# Patient Record
Sex: FEMALE | Race: OTHER | Employment: FULL TIME | URBAN - METROPOLITAN AREA
[De-identification: names, ages, dates, MRNs, and addresses within clinical notes are randomized per-mention and may not be internally consistent; named-entity substitution may affect disease eponyms.]

---

## 2020-12-01 ENCOUNTER — HOSPITAL ENCOUNTER (OUTPATIENT)
Age: 30
Discharge: HOME | End: 2020-12-01
Attending: OBSTETRICS & GYNECOLOGY | Admitting: OBSTETRICS & GYNECOLOGY
Payer: COMMERCIAL

## 2020-12-01 VITALS
TEMPERATURE: 98.7 F | RESPIRATION RATE: 20 BRPM | WEIGHT: 130 LBS | BODY MASS INDEX: 29.25 KG/M2 | HEART RATE: 82 BPM | HEIGHT: 56 IN | DIASTOLIC BLOOD PRESSURE: 84 MMHG | SYSTOLIC BLOOD PRESSURE: 120 MMHG

## 2020-12-01 PROBLEM — O47.9 UTERINE CONTRACTIONS DURING PREGNANCY: Status: ACTIVE | Noted: 2020-12-01

## 2020-12-01 LAB
A1 MICROGLOB PLACENTAL VAG QL: NEGATIVE
CONTROL LINE PRESENT?: NORMAL
EXPIRATION DATE: NORMAL
INTERNAL NEGATIVE CONTROL: NORMAL
KIT LOT NO.: NORMAL

## 2020-12-01 PROCEDURE — 99283 EMERGENCY DEPT VISIT LOW MDM: CPT

## 2020-12-01 PROCEDURE — 84112 EVAL AMNIOTIC FLUID PROTEIN: CPT | Performed by: ADVANCED PRACTICE MIDWIFE

## 2020-12-01 PROCEDURE — 59025 FETAL NON-STRESS TEST: CPT

## 2020-12-01 NOTE — DISCHARGE INSTRUCTIONS
Patient Education        Week 40 of Your Pregnancy: Care Instructions  Your Care Instructions     By week 36, you have reached your due date. Your baby could be coming any day. But it's a good idea to think ahead to the next few weeks and what might happen. If this is your first time having a baby, try not to worry. If you don't start labor on your own by 41 or 42 weeks, your doctor may recommend giving you medicines to start labor. This care sheet gives you information about how labor can be started. It also gives you some ideas about breathing exercises you can do if you start to feel anxious or if you are trying to relax. Follow-up care is a key part of your treatment and safety. Be sure to make and go to all appointments, and call your doctor if you are having problems. It's also a good idea to know your test results and keep a list of the medicines you take. How can you care for yourself at home? Learn how labor can be started  · If you and your baby are both healthy and ready, and if your cervix has started to open, your doctor may \"break your water\" (rupture the amniotic sac). This often starts labor. · If your cervix is not quite ready, you may get a medicine called Pitocin through an IV to start contractions. · If your cervix is still very firm, you may have prostaglandin tablets (misoprostol) placed in your vagina to soften the cervix. Try guided imagery to help you relax  · Find a comfortable place to sit or lie down. Close your eyes. · Start by just taking a few deep breaths to help you relax. · Picture a setting that is calm and peaceful. This could be a beach, a mountain setting, a meadow, or a scene that you choose. · Imagine your scene, and try to add some detail. For example, is there a breeze? What does the jovita look like? Is it clear, or are there clouds? · It often helps to add a path to your scene.  For example, as you enter the meadow, imagine a path leading you through the meadow to the trees on the other side. As you follow the path farther into the [facility] you feel more and more relaxed. · When you are deep into your scene and are feeling relaxed, take a few minutes to breathe slowly and feel the calm. · When you are ready, slowly take yourself out of the scene back to the present. Tell yourself that you will feel relaxed and refreshed and will bring that sense of calm with you. · Count to 3, and open your eyes. Where can you learn more? Go to http://www.gray.com/  Enter T922 in the search box to learn more about \"Week 40 of Your Pregnancy: Care Instructions. \"  Current as of: February 11, 2020               Content Version: 12.6  © 0394-8076 [employer]. Care instructions adapted under license by [employer] (which disclaims liability or warranty for this information). If you have questions about a medical condition or this instruction, always ask your healthcare professional. [address] any warranty or liability for your use of this information.

## 2020-12-01 NOTE — PROGRESS NOTES
1700 , 40 weeks pregnant here for contractions and leaking of fluid. 1720 Amnisure negative. Midwife given report. Orders received to dc home with labor precautions. Education on when to come back to hospital discussed at bedside with midwife.

## 2020-12-01 NOTE — PROGRESS NOTES
HPI:    Subjective:     Claiborne Dandy, 31 y. o. at 40w0d presents to L&D with c/o Vaginal bleeding and possible ROM. Denies having any ctx pain at this time. Reports having intercourse last night and pink spotting since. Assessment:  No past medical history on file. No past surgical history on file. Allergies   Allergen Reactions    Latex Hives    Pineapple Swelling    Nickel Rash     Prior to Admission medications    Medication Sig Start Date End Date Taking? Authorizing Provider   PNV 12-iron-methylfolate-dha 29 mg iron-1 mg -350 mg CKDR Take  by mouth. Yes Provider, Historical        Objective:     Vitals:  Vitals:    20 1650 20 1659   Temp:  98.7 °F (37.1 °C)   Weight: 59 kg (130 lb)    Height: 4' 8\" (1.422 m)         Physical Exam:  Patient without distress. Abdomen: soft, nontender  Fundus: soft and non tender  Perineum: blood absent, amniotic fluid absent  Membranes:  Intact  Fetal Heart Rate: Reactive, Baseline 125, moderate variability, no decels    Cervical exam: Dilated: 2/70/-1 per RN      Assessment/Plan:     Plan:   Nitrazine Negative  DC home with standard & ER labor precautions. Follow-up in office for routine visit.        Signed By:  Qing De Leon CNM / Yany Bhatti CNM    2020

## 2020-12-04 ENCOUNTER — HOSPITAL ENCOUNTER (INPATIENT)
Age: 30
LOS: 2 days | Discharge: HOME | End: 2020-12-06
Attending: OBSTETRICS & GYNECOLOGY | Admitting: OBSTETRICS & GYNECOLOGY
Payer: COMMERCIAL

## 2020-12-04 PROBLEM — O09.899 RUBELLA NON-IMMUNE STATUS, ANTEPARTUM: Status: ACTIVE | Noted: 2020-12-04

## 2020-12-04 PROBLEM — O26.893 RH NEGATIVE STATUS DURING PREGNANCY IN THIRD TRIMESTER: Status: ACTIVE | Noted: 2020-12-04

## 2020-12-04 PROBLEM — Z37.9 NORMAL LABOR: Status: ACTIVE | Noted: 2020-12-04

## 2020-12-04 PROBLEM — Z33.1 IUP (INTRAUTERINE PREGNANCY), INCIDENTAL: Status: ACTIVE | Noted: 2020-12-04

## 2020-12-04 PROBLEM — Z28.39 RUBELLA NON-IMMUNE STATUS, ANTEPARTUM: Status: ACTIVE | Noted: 2020-12-04

## 2020-12-04 PROBLEM — Z67.91 RH NEGATIVE STATUS DURING PREGNANCY IN THIRD TRIMESTER: Status: ACTIVE | Noted: 2020-12-04

## 2020-12-04 LAB
ABO + RH BLD: NORMAL
BASOPHILS # BLD: 0 K/UL (ref 0–0.1)
BASOPHILS NFR BLD: 0 % (ref 0–2)
BLOOD BANK CMNT PATIENT-IMP: NORMAL
BLOOD GROUP ANTIBODIES SERPL: NORMAL
DIFFERENTIAL METHOD BLD: ABNORMAL
EOSINOPHIL # BLD: 0 K/UL (ref 0–0.4)
EOSINOPHIL NFR BLD: 0 % (ref 0–5)
ERYTHROCYTE [DISTWIDTH] IN BLOOD BY AUTOMATED COUNT: 14.8 % (ref 11.6–14.5)
HCT VFR BLD AUTO: 35.9 % (ref 35–45)
HGB BLD-MCNC: 11.8 G/DL (ref 12–16)
LYMPHOCYTES # BLD: 3.6 K/UL (ref 0.9–3.6)
LYMPHOCYTES NFR BLD: 28 % (ref 21–52)
MCH RBC QN AUTO: 29.1 PG (ref 24–34)
MCHC RBC AUTO-ENTMCNC: 32.9 G/DL (ref 31–37)
MCV RBC AUTO: 88.4 FL (ref 74–97)
MONOCYTES # BLD: 0.9 K/UL (ref 0.05–1.2)
MONOCYTES NFR BLD: 7 % (ref 3–10)
NEUTS SEG # BLD: 8.2 K/UL (ref 1.8–8)
NEUTS SEG NFR BLD: 65 % (ref 40–73)
PLATELET # BLD AUTO: 394 K/UL (ref 135–420)
PMV BLD AUTO: 11 FL (ref 9.2–11.8)
RBC # BLD AUTO: 4.06 M/UL (ref 4.2–5.3)
SPECIMEN EXP DATE BLD: NORMAL
WBC # BLD AUTO: 12.8 K/UL (ref 4.6–13.2)

## 2020-12-04 PROCEDURE — 75410000000 HC DELIVERY VAGINAL/SINGLE

## 2020-12-04 PROCEDURE — 3E0234Z INTRODUCTION OF SERUM, TOXOID AND VACCINE INTO MUSCLE, PERCUTANEOUS APPROACH: ICD-10-PCS | Performed by: OBSTETRICS & GYNECOLOGY

## 2020-12-04 PROCEDURE — 75410000003 HC RECOV DEL/VAG/CSECN EA 0.5 HR

## 2020-12-04 PROCEDURE — 86900 BLOOD TYPING SEROLOGIC ABO: CPT

## 2020-12-04 PROCEDURE — 75410000002 HC LABOR FEE PER 1 HR

## 2020-12-04 PROCEDURE — 74011250636 HC RX REV CODE- 250/636: Performed by: ADVANCED PRACTICE MIDWIFE

## 2020-12-04 PROCEDURE — 65270000029 HC RM PRIVATE

## 2020-12-04 PROCEDURE — 85025 COMPLETE CBC W/AUTO DIFF WBC: CPT

## 2020-12-04 PROCEDURE — 74011250637 HC RX REV CODE- 250/637: Performed by: ADVANCED PRACTICE MIDWIFE

## 2020-12-04 RX ORDER — AMOXICILLIN 250 MG
1 CAPSULE ORAL
Status: DISCONTINUED | OUTPATIENT
Start: 2020-12-04 | End: 2020-12-06 | Stop reason: HOSPADM

## 2020-12-04 RX ORDER — METHYLERGONOVINE MALEATE 0.2 MG/ML
0.2 INJECTION INTRAVENOUS AS NEEDED
Status: DISCONTINUED | OUTPATIENT
Start: 2020-12-04 | End: 2020-12-04 | Stop reason: HOSPADM

## 2020-12-04 RX ORDER — SODIUM CHLORIDE 0.9 % (FLUSH) 0.9 %
5-40 SYRINGE (ML) INJECTION AS NEEDED
Status: DISCONTINUED | OUTPATIENT
Start: 2020-12-04 | End: 2020-12-04

## 2020-12-04 RX ORDER — HYDROMORPHONE HYDROCHLORIDE 1 MG/ML
1 INJECTION, SOLUTION INTRAMUSCULAR; INTRAVENOUS; SUBCUTANEOUS
Status: DISCONTINUED | OUTPATIENT
Start: 2020-12-04 | End: 2020-12-04

## 2020-12-04 RX ORDER — MINERAL OIL
30 OIL (ML) ORAL AS NEEDED
Status: DISCONTINUED | OUTPATIENT
Start: 2020-12-04 | End: 2020-12-04

## 2020-12-04 RX ORDER — OXYTOCIN/0.9 % SODIUM CHLORIDE 30/500 ML
95 PLASTIC BAG, INJECTION (ML) INTRAVENOUS AS NEEDED
Status: DISCONTINUED | OUTPATIENT
Start: 2020-12-04 | End: 2020-12-04 | Stop reason: HOSPADM

## 2020-12-04 RX ORDER — PROMETHAZINE HYDROCHLORIDE 25 MG/ML
25 INJECTION, SOLUTION INTRAMUSCULAR; INTRAVENOUS
Status: DISCONTINUED | OUTPATIENT
Start: 2020-12-04 | End: 2020-12-06 | Stop reason: HOSPADM

## 2020-12-04 RX ORDER — BUTORPHANOL TARTRATE 2 MG/ML
2 INJECTION INTRAMUSCULAR; INTRAVENOUS
Status: DISCONTINUED | OUTPATIENT
Start: 2020-12-04 | End: 2020-12-04

## 2020-12-04 RX ORDER — ACETAMINOPHEN 325 MG/1
650 TABLET ORAL
Status: DISCONTINUED | OUTPATIENT
Start: 2020-12-04 | End: 2020-12-06 | Stop reason: HOSPADM

## 2020-12-04 RX ORDER — SODIUM CHLORIDE, SODIUM LACTATE, POTASSIUM CHLORIDE, CALCIUM CHLORIDE 600; 310; 30; 20 MG/100ML; MG/100ML; MG/100ML; MG/100ML
125 INJECTION, SOLUTION INTRAVENOUS CONTINUOUS
Status: DISCONTINUED | OUTPATIENT
Start: 2020-12-04 | End: 2020-12-04

## 2020-12-04 RX ORDER — NALOXONE HYDROCHLORIDE 0.4 MG/ML
0.2 INJECTION, SOLUTION INTRAMUSCULAR; INTRAVENOUS; SUBCUTANEOUS AS NEEDED
Status: DISCONTINUED | OUTPATIENT
Start: 2020-12-04 | End: 2020-12-04

## 2020-12-04 RX ORDER — IBUPROFEN 400 MG/1
800 TABLET ORAL
Status: DISCONTINUED | OUTPATIENT
Start: 2020-12-04 | End: 2020-12-06 | Stop reason: HOSPADM

## 2020-12-04 RX ORDER — LIDOCAINE HYDROCHLORIDE 10 MG/ML
20 INJECTION, SOLUTION EPIDURAL; INFILTRATION; INTRACAUDAL; PERINEURAL AS NEEDED
Status: DISCONTINUED | OUTPATIENT
Start: 2020-12-04 | End: 2020-12-04

## 2020-12-04 RX ORDER — ZOLPIDEM TARTRATE 5 MG/1
5 TABLET ORAL
Status: DISCONTINUED | OUTPATIENT
Start: 2020-12-04 | End: 2020-12-06 | Stop reason: HOSPADM

## 2020-12-04 RX ORDER — TERBUTALINE SULFATE 1 MG/ML
0.25 INJECTION SUBCUTANEOUS
Status: DISCONTINUED | OUTPATIENT
Start: 2020-12-04 | End: 2020-12-04 | Stop reason: HOSPADM

## 2020-12-04 RX ORDER — OXYTOCIN/RINGER'S LACTATE 30/500 ML
10 PLASTIC BAG, INJECTION (ML) INTRAVENOUS AS NEEDED
Status: DISCONTINUED | OUTPATIENT
Start: 2020-12-04 | End: 2020-12-04

## 2020-12-04 RX ORDER — ONDANSETRON 2 MG/ML
4 INJECTION INTRAMUSCULAR; INTRAVENOUS
Status: DISCONTINUED | OUTPATIENT
Start: 2020-12-04 | End: 2020-12-04

## 2020-12-04 RX ORDER — FENTANYL/ROPIVACAINE/NS/PF 2MCG/ML-.1
1-15 PLASTIC BAG, INJECTION (ML) EPIDURAL
Status: DISCONTINUED | OUTPATIENT
Start: 2020-12-04 | End: 2020-12-04

## 2020-12-04 RX ORDER — PHENYLEPHRINE HCL IN 0.9% NACL 1 MG/10 ML
80 SYRINGE (ML) INTRAVENOUS AS NEEDED
Status: DISCONTINUED | OUTPATIENT
Start: 2020-12-04 | End: 2020-12-04

## 2020-12-04 RX ORDER — NALBUPHINE HYDROCHLORIDE 10 MG/ML
10 INJECTION, SOLUTION INTRAMUSCULAR; INTRAVENOUS; SUBCUTANEOUS
Status: DISCONTINUED | OUTPATIENT
Start: 2020-12-04 | End: 2020-12-04

## 2020-12-04 RX ORDER — SODIUM CHLORIDE 0.9 % (FLUSH) 0.9 %
5-40 SYRINGE (ML) INJECTION EVERY 8 HOURS
Status: DISCONTINUED | OUTPATIENT
Start: 2020-12-04 | End: 2020-12-04 | Stop reason: HOSPADM

## 2020-12-04 RX ADMIN — SODIUM CHLORIDE, SODIUM LACTATE, POTASSIUM CHLORIDE, AND CALCIUM CHLORIDE 125 ML/HR: 600; 310; 30; 20 INJECTION, SOLUTION INTRAVENOUS at 10:57

## 2020-12-04 RX ADMIN — IBUPROFEN 800 MG: 400 TABLET, FILM COATED ORAL at 16:57

## 2020-12-04 RX ADMIN — BUTORPHANOL TARTRATE 2 MG: 2 INJECTION, SOLUTION INTRAMUSCULAR; INTRAVENOUS at 09:45

## 2020-12-04 NOTE — PROGRESS NOTES
Bedside and Verbal shift change report given to BRIAN Omalley RN (oncoming nurse) by CUBA Singleton (offgoing nurse). Report included the following information SBAR, Intake/Output, MAR and Recent Results. 0920- VSS, assessment completed. Pt sitting in high gilmore's. Coping well with contractions, complaints of back labor and nausea. Offered SVE as patient is requesting stadol. Pt up to bathroom and voided. 0940- SVE 8/100/0. Pt placed into hands and knees. Educated on movement/rocking to cope with contractions. 1055- Pt turned left lateral with leg in stirrup. [address] noted. Pt turned right lateral, SVE unchanged. Peanut ball in place on right lateral side. No needs. 1200- Back from bathroom, standing at bedside leaning over side. 1215- Pt requested back to bed. Turned left lateral with peanut ball. 1225- Pt sat high gilmore's. EFM and TOCO adjusted. 1255- BRIAN Johns, CNM at bedside. SVE unchanged. Pt very tense. AROM of fore bag noted, clear fluid. Pt does not want an epidural. Placed into hands and knees. 1315- Pt encouraged to get OOB with RN and ambulate/sway at bedside. RN at bedside for support. 1355- Pt requesting SVE - 8/100/+1.     1520- RN and CNM in room; SVE ant lip/100/+1.    1522- Pushing with RN and CNM at bedside. 1- Delivery of viable female infant. Stunned with poor tone, no cry and poor color. Cord clamped and cut, to warmer by 1 min.     1603- Placenta delivered intact. No sending. Pt intact. EBL 200ml. Pt firm @U-1, small bleeding. 1650- Pt up to bathroom with RN assist. Dawit Abraham to void, and paty care completed. Fundus firm @U, bleeding small amount. Motrin given. No needs. 1828- TRANSFER - OUT REPORT:    Verbal report given to MELIDA Conte RN(name) on Byron Cooper  being transferred to Mother Infant (unit) for routine progression of care       Report consisted of patients Situation, Background, Assessment and   Recommendations(SBAR).      Information from the following report(s) SBAR, Intake/Output, MAR and Recent Results was reviewed with the receiving nurse. Lines:   Peripheral IV 12/04/20 Right Hand (Active)   Site Assessment Clean, dry, & intact 12/04/20 0922   Phlebitis Assessment 0 12/04/20 0922   Infiltration Assessment 0 12/04/20 0922   Dressing Status Clean, dry, & intact 12/04/20 0922   Dressing Type Tape;Transparent 12/04/20 0922   Hub Color/Line Status Pink; Infusing 12/04/20 9180        Opportunity for questions and clarification was provided.       Patient transported with:   Registered Nurse

## 2020-12-04 NOTE — H&P
Obstetrical History and Physical    Subjective:     Date of Admission: 2020    Patient is a 31 y.o.   female admitted with pregnancy at 40+3 for labor and SROM, clear fluid. Her GBS is negative. For Obstetric history, see prenantal.    For Review of Systems, see prenatal    No past medical history on file. No past surgical history on file. Prior to Admission medications    Medication Sig Start Date End Date Taking? Authorizing Provider   PNV 12-iron-methylfolate-dha 29 mg iron-1 mg -350 mg CKDR Take  by mouth. Provider, Historical     Allergies   Allergen Reactions    Latex Hives    Pineapple Swelling    Nickel Rash      Social History     Tobacco Use    Smoking status: Former Smoker    Smokeless tobacco: Never Used   Substance Use Topics    Alcohol use: Never     Frequency: Never      No family history on file. Objective: There were no vitals taken for this visit. No data recorded. No intake/output data recorded. No intake/output data recorded. HEENT: No pallor, no jaundice, Thyroid and throat normal  RESPIRATORY: Clear to A & P  CVS: pulse reg, HS normal  ABDOMEN: Gravid. Vertex. EFW=7lb +-1lb. No abnormal tenderness. Pelvic: Cervix 4-5,   Effaced: 50%  Station:  -2  Data Review:   No results found for this or any previous visit (from the past 24 hour(s)). Monitor:  Reactivity:present Variability:present Baseline:within normal limits    Assessment:     Principal Problem:    IUP (intrauterine pregnancy), incidental (2020)    Active Problems:    Uterine contractions during pregnancy (2020)      Rh negative status during pregnancy in third trimester (2020)      Rubella non-immune status, antepartum (2020)        Plan:   Admit to L&D  Anticipate     Check labs:  CBC, T&S    Type of admit:In-Patient    I saw and examined patient. Dr. Martin Nations aware of admission.     Signed By: Humberto Wilson CNM                         2020

## 2020-12-04 NOTE — L&D DELIVERY NOTE
Delivery Summary    Patient: Gely Freeman MRN: [id number]  SSN: [id number]    YOB: 1990  Age: 31 y.o. Sex: female       Information for the patient's :  Oswaldo Pearl [[id number]]       Labor Events:    Labor: No    Steroids: None   Cervical Ripening Date/Time:       Cervical Ripening Type: None   Antibiotics During Labor: No   Rupture Identifier:      Rupture Date/Time: 2020 3:40 AM   Rupture Type: SROM   Amniotic Fluid Volume:      Amniotic Fluid Description: Clear    Amniotic Fluid Odor: None    Induction: None       Induction Date/Time:        Indications for Induction:      Augmentation: None   Augmentation Date/Time:      Indications for Augmentation:     Labor complications: None       Additional complications:        Delivery Events:  Indications For Episiotomy:     Episiotomy:     Perineal Laceration(s):     Repaired:     Periurethral Laceration Location:      Repaired:     Labial Laceration Location:     Repaired:     Sulcal Laceration Location:     Repaired:     Vaginal Laceration Location:     Repaired:     Cervical Laceration Location:     Repaired:     Repair Suture:     Number of Repair Packets:     Estimated Blood Loss (ml):  ml   Quantitative Blood Loss (ml)                Delivery Date: 2020    Delivery Time: 3:43 PM  Delivery Type: Vaginal, Spontaneous  Sex:  Female    Gestational Age: 44w3d   Delivery Clinician:  Chelsi Aviles  Living Status: Living   Delivery Location: L&D            APGARS  One minute Five minutes Ten minutes   Skin color: 0   1        Heart rate: 2   2        Grimace: 1   2        Muscle tone: 1   2        Breathin   2        Totals: 5   9            Presentation: Vertex    Position:   Occiput Anterior  Resuscitation Method:  Suctioning-bulb; Tactile Stimulation;Suctioning-deep     Meconium Stained: None      Cord Information: 3 Vessels  Complications: None  Cord around:    Delayed cord clamping?  No  Cord clamped date/time:   Disposition of Cord Blood: Lab    Blood Gases Sent?: Yes    Placenta:  Date/Time:    Removal: Spontaneous      Appearance: Intact      Measurements:  Birth Weight: 2.93 kg      Birth Length: 50.8 cm      Head Circumference: 32.5 cm      Chest Circumference: 31.5 cm     Abdominal Girth: 28.5 cm    Other Providers:   OLIVER Marcus;;Cj MITCHELL, Obstetrician;Primary Nurse;Primary [place] Nurse;Nicu Nurse;Neonatologist;Anesthesiologist;Crna;Nurse Practitioner;Midwife;Nursery Nurse           Group B Strep: No results found for: GRBSEXT, GRBSEXT  Information for the patient's :  [facility] Comment [[id number]]   No results found for: ABORH, PCTABR, PCTDIG, BILI, ABORHEXT, ABORH     No results for input(s): PCO2CB, PO2CB, HCO3I, SO2I, IBD, PTEMPI, SPECTI, PHICB, ISITE, IDEV, IALLEN in the last 72 hours.      Jonathan Doyle CNM

## 2020-12-04 NOTE — PROGRESS NOTES
56-  @ 40+3 weeks arrived to L&D with c/o SROm @ 0340 this am and frequent ctx. Pt transferred to room 7 Nitrazine + and SVE performed. 18- MELIDA Brock at bedside. 4836- Bedside verbal report given to BRIAN Omalley RN and all patient care turned over at this time.

## 2020-12-05 LAB
HCT VFR BLD AUTO: 28.4 % (ref 35–45)
HGB BLD-MCNC: 9.5 G/DL (ref 12–16)

## 2020-12-05 PROCEDURE — 74011250637 HC RX REV CODE- 250/637: Performed by: ADVANCED PRACTICE MIDWIFE

## 2020-12-05 PROCEDURE — 36415 COLL VENOUS BLD VENIPUNCTURE: CPT

## 2020-12-05 PROCEDURE — 85018 HEMOGLOBIN: CPT

## 2020-12-05 PROCEDURE — 85014 HEMATOCRIT: CPT

## 2020-12-05 PROCEDURE — 86900 BLOOD TYPING SEROLOGIC ABO: CPT

## 2020-12-05 PROCEDURE — 65270000029 HC RM PRIVATE

## 2020-12-05 PROCEDURE — 85461 HEMOGLOBIN FETAL: CPT

## 2020-12-05 PROCEDURE — 74011250636 HC RX REV CODE- 250/636: Performed by: ADVANCED PRACTICE MIDWIFE

## 2020-12-05 RX ORDER — IBUPROFEN 800 MG/1
800 TABLET ORAL
Qty: 90 TAB | Refills: 1 | OUTPATIENT
Start: 2020-12-05 | End: 2021-10-17

## 2020-12-05 RX ADMIN — ACETAMINOPHEN 650 MG: 325 TABLET ORAL at 23:39

## 2020-12-05 RX ADMIN — IBUPROFEN 800 MG: 400 TABLET, FILM COATED ORAL at 23:39

## 2020-12-05 RX ADMIN — HUMAN RHO(D) IMMUNE GLOBULIN 0.3 MG: 300 INJECTION, SOLUTION INTRAMUSCULAR at 10:50

## 2020-12-05 NOTE — PROGRESS NOTES
Assumed care of pt.  0745-assessment completed. INT intact in R hand. Educated on Rhogam injection. Refusing MMR. Denies needs. 0910-eating breakfast.  1040-consent signed for rhogam.  VSS. 1050-Rhogam given. 1105-VSS. No reaction noted from Rhogam.  1205-breast feeding. 1250- INT d/c'd  1345-holding infant. Denies needs. 1455-Bedside and Verbal shift change report given to BRIAN Hylton RN  (oncoming nurse) by BIBI Purcell LPN (offgoing nurse). Report given with SBAR, Kardex, Intake/Output, MAR and Recent Results.

## 2020-12-05 NOTE — PROGRESS NOTES
Problem: Pain  Goal: *Control of Pain  Outcome: Progressing Towards Goal     Problem: Vaginal Delivery: Day of Delivery-Post delivery  Goal: Activity/Safety  Outcome: Progressing Towards Goal  Goal: Nutrition/Diet  Outcome: Progressing Towards Goal  Goal: Discharge Planning  Outcome: Progressing Towards Goal  Goal: Medications  Outcome: Progressing Towards Goal  Goal: Treatments/Interventions/Procedures  Outcome: Progressing Towards Goal  Goal: *Vital signs within defined limits  Outcome: Progressing Towards Goal  Goal: *Labs within defined limits  Outcome: Progressing Towards Goal  Goal: *Hemodynamically stable  Outcome: Progressing Towards Goal  Goal: *Optimal pain control at patient's stated goal  Outcome: Progressing Towards Goal  Goal: *Participates in infant care  Outcome: Progressing Towards Goal  Goal: *Demonstrates progressive activity  Outcome: Progressing Towards Goal  Goal: *Tolerating diet  Outcome: Progressing Towards Goal

## 2020-12-05 NOTE — PROGRESS NOTES
Bedside and Verbal shift change report given to ISAURO Uriarte RN (oncoming nurse) by Gisele Cosme RN (offgoing nurse). Report given with SBAR, Sandi, MAR and Recent Results.

## 2020-12-05 NOTE — PROGRESS NOTES
Progress Note    Patient: Mary Penn MRN: [id number]     YOB: 1990  Age: 31 y.o. Subjective:     Postpartum Day: 1    The patient is feeling well. Pain is  well controlled with current medications. Urinary output is adequate. Baby is feeding via breast without difficulty. Objective:      Patient Vitals for the past 12 hrs:   Temp Pulse Resp BP SpO2   12/05/20 1105 98.1 °F (36.7 °C) 92 16 126/68 97 %   12/05/20 1040 98.2 °F (36.8 °C) 96 16 101/60 98 %   12/05/20 0715 98.1 °F (36.7 °C) 88 18 123/67 97 %       General:    alert, cooperative, no distress   Lochia:  appropriate   Uterine Fundus:   firm @ umbilicus    Perineum:  well-approximated   DVT Evaluation:  No evidence of DVT seen on physical exam.  Negative Cathryn's sign. Lab/Data Review:  Recent Results (from the past 24 hour(s))   HEMATOCRIT    Collection Time: 12/05/20  5:35 AM   Result Value Ref Range    HCT 28.4 (L) 35.0 - 45.0 %   HEMOGLOBIN    Collection Time: 12/05/20  5:35 AM   Result Value Ref Range    HGB 9.5 (L) 12.0 - 16.0 g/dL   RH IMMUNE GLOBULIN EVAL-LAB ORDER    Collection Time: 12/05/20  5:35 AM   Result Value Ref Range    ABO/Rh(D) A NEGATIVE     Fetal screen NEG     Cord Blood Type O  POS       CALLED TO: MARINE AT 1011 ON 12/5/20 LAD     Unit number [id number]     Blood component type RH IMMUNE GLOBULIN     Unit division 00     Status of unit ISSUED      All lab results for the last 24 hours reviewed. Assessment:     Delivery: spontaneous vaginal delivery    Plan:     Doing well postpartum vaginal delivery. Continue current postpartum care. Encouraged hydration, nutrition and ambulation. Plan DC home tomorrow.     Signed By: Mirtha Estrada CNM     December 5, 2020

## 2020-12-05 NOTE — DISCHARGE SUMMARY
Obstetrical Discharge Summary     Name: Senia Mcnair MRN: [id number]  SSN: [id number]    YOB: 1990  Age: 31 y.o. Sex: female      Allergies: Latex; Pineapple; and Nickel    Admit Date: 2020    Discharge Date: 2020     Admitting Physician: Tip Gaspar MD     Attending Physician:  Tim Kate MD     * Admission Diagnoses: Normal labor [O80, Z37.9]    * Discharge Diagnoses:   Information for the patient's :  Ant Mckinney [[id number]]   Delivery of a 2.93 kg female infant via Vaginal, Spontaneous on 2020 at 3:43 PM  by Alec Lynch. Apgars were 5  and 9 . Additional Diagnoses:   Hospital Problems as of 2020 Date Reviewed: 2020          Codes Class Noted - Resolved POA    * (Principal) IUP (intrauterine pregnancy), incidental ICD-10-CM: Z33.1  ICD-9-CM: V22.2  2020 - Present Yes        Rh negative status during pregnancy in third trimester ICD-10-CM: O26.893, Z67.91  ICD-9-CM: 646.83  2020 - Present Yes        Rubella non-immune status, antepartum ICD-10-CM: O99.891, Z28.3  ICD-9-CM: 646.83, V15.83  2020 - Present Yes        Normal labor ICD-10-CM: Tangela Quarry, Z37.9  ICD-9-CM: 693  2020 - Present Unknown        Uterine contractions during pregnancy ICD-10-CM: O62.2  ICD-9-CM: 661.20  2020 - Present Yes             Lab Results   Component Value Date/Time    ABO/Rh(D) A NEGATIVE 2020 05:35 AM      Immunization History   Administered Date(s) Administered    Rho(D) Immune Globulin - IM 2020       * Procedures:   * No surgery found *           * Discharge Condition: good    * Hospital Course: Normal hospital course following the delivery. * Disposition: Home    Discharge Medications:   Current Discharge Medication List      START taking these medications    Details   ibuprofen (MOTRIN) 800 mg tablet Take 1 Tab by mouth every eight (8) hours as needed for Pain.   Qty: 90 Tab, Refills: 1         CONTINUE these medications which have NOT CHANGED    Details   PNV 12-iron-methylfolate-dha 29 mg iron-1 mg -350 mg CKDR Take  by mouth. * Follow-up Care/Patient Instructions:   Activity: Activity as tolerated  Diet: Regular Diet  Wound Care: Keep wound clean and dry    Follow-up Information     Follow up With Specialties Details Why Contact Info    Sterling Perry CNM Certified Nurse Midwife Schedule an appointment as soon as possible for a visit in 7 weeks  [address] [place] [address] [address] [facility]  [phone]

## 2020-12-05 NOTE — LACTATION NOTE
This note was copied from a baby's chart. 65 infant latched and nursing well at this time. Mom educated on breastfeeding basics--hunger cues, feeding on demand, waking baby if baby sleeps too long between feeds, importance of skin to skin, positioning and latching, risk of pacifier use and supplemental feedings, and importance of rooming in--and use of log sheet. Mom also educated on benefits of breastfeeding for herself and baby. Mom verbalized understanding. No questions at this time.

## 2020-12-05 NOTE — PROGRESS NOTES
1455 Bedside and Verbal shift change report given to BRIAN Hylton RN (oncoming nurse) by BIBI Purcell RN (offgoing nurse). Report included the following information SBAR, Kardex, Procedure Summary, Intake/Output, MAR and Recent Results. 1550 Shift assessment complete. Patient denies headache, visual disturbances, and right epigastic pain at this time. Breath sounds clear bilaterally. Patient is passing gas, bowel sounds active, with last BM being 12/5. Fundus firm at U with scant lochia, no clots noted on assessment. IV site out. No edema in upper extremities, none in lower extremities. Patient free of clonus in lower extremities and denying any pain/tenderness behind knees or in calves. Patient rating pain 0/10 and no pain medication at this time. Patient educated to notify nursing staff of: SOB, chest pain/heaviness, blurred vision, lightheadedness, increase in bleeding, and passing of clots, patient verbalized understanding. 1645 Patient has no needs or concerns at this time    [phone] Patient has no needs or concerns at this time. 1815 Patient has no needs or concerns at this time. 1925 Bedside and Verbal shift change report given to ISAURO Uriarte RN (oncoming nurse) by Lety Collins RN (offgoing nurse). Report included the following information SBAR, Kardex, Procedure Summary, Intake/Output, MAR and Recent Results.

## 2020-12-05 NOTE — PROGRESS NOTES
1905 Bedside and Verbal shift change report given to ISAURO Uriarte RN (oncoming nurse) by Zoe Carballo RN (offgoing nurse). Report included the following information SBAR, Kardex, Procedure Summary, Intake/Output, MAR and Recent Results.  Rounding complete, mother holding . No needs expressed at this time, call bell within reach     2158 Shift assessment complete. Patient denies headache, visual disturbances, and right epigastic pain at this time. Breath sounds clear bilaterally. Patient is passing gas, bowel sounds active, with last BM being 20. Fundus firm at U-1 with scant lochia, no clots noted on assessment. IV site intact. No edema in upper extremities, no in lower extremities. Patient free of clonus in lower extremities and denying any pain/tenderness behind knees or in calves. Patient rating pain 4/10 and declining pain medication at this time. Patient educated to notify nursing staff of: SOB, chest pain/heaviness, blurred vision, lightheadedness, increase in bleeding, and passing of clots, patient verbalized understanding. Fresh water provided, lights dimmed, no other needs expressed at this time     2351 Mother up to restroom. [place] supplies provided. No other needs expressed. Mother to feed     18 Shift reassessment completed as charted     36 Rounding complete, mother holding . Fresh water provided. No other needs expressed at this time, call bell within reach     0457 Patient ambulating to NICU    0640 Rounding complete, mother up to restroom. No needs expressed at this time. Mother to feed      0 Bedside and Verbal shift change report given to BIBI Purcell LPN (oncoming nurse) by Yraed Uriarte RN (offgoing nurse). Report included the following information SBAR, Kardex, Procedure Summary, Intake/Output, MAR and Recent Results.

## 2020-12-05 NOTE — PROGRESS NOTES
Problem: Pain  Goal: *Control of Pain  12/5/2020 1009 by Keke Clamp, LPN  Outcome: Progressing Towards Goal  12/5/2020 1004 by Keke Clamp, LPN  Outcome: Progressing Towards Goal     Problem: Lactation Care Plan  Goal: *Infant latching appropriately  12/5/2020 1009 by Keke Clamp, LPN  Outcome: Progressing Towards Goal  12/5/2020 1004 by Keke Clamp, LPN  Outcome: Progressing Towards Goal  Goal: *Weight loss less than 10% of birth weight  12/5/2020 1009 by Keke Clamp, LPN  Outcome: Progressing Towards Goal  12/5/2020 1004 by Keke Clamp, LPN  Outcome: Progressing Towards Goal     Problem: Patient Education: Go to Patient Education Activity  Goal: Patient/Family Education  12/5/2020 1009 by Keke Clamp, LPN  Outcome: Progressing Towards Goal  12/5/2020 1004 by Keke Clamp, LPN  Outcome: Progressing Towards Goal     Problem: Vaginal Delivery: Postpartum Day 1  Goal: Activity/Safety  Outcome: Progressing Towards Goal  Goal: Consults, if ordered  Outcome: Progressing Towards Goal  Goal: Diagnostic Test/Procedures  Outcome: Progressing Towards Goal  Goal: Nutrition/Diet  Outcome: Progressing Towards Goal  Goal: Discharge Planning  Outcome: Progressing Towards Goal  Goal: Medications  Outcome: Progressing Towards Goal  Goal: Treatments/Interventions/Procedures  Outcome: Progressing Towards Goal  Goal: Psychosocial  Outcome: Progressing Towards Goal  Goal: *Vital signs within defined limits  Outcome: Progressing Towards Goal  Goal: *Labs within defined limits  Outcome: Progressing Towards Goal  Goal: *Hemodynamically stable  Outcome: Progressing Towards Goal  Goal: *Optimal pain control at patient's stated goal  Outcome: Progressing Towards Goal  Goal: *Participates in infant care  Outcome: Progressing Towards Goal  Goal: *Demonstrates progressive activity  Outcome: Progressing Towards Goal  Goal: *Performs self perineal care  Outcome: Progressing Towards Goal  Goal: *Appropriate parent-infant bonding  Outcome: Progressing Towards Goal  Goal: *Tolerating diet  Outcome: Progressing Towards Goal  Goal: *Performs self breast care  Outcome: Progressing Towards Goal

## 2020-12-06 VITALS
WEIGHT: 130 LBS | OXYGEN SATURATION: 100 % | RESPIRATION RATE: 16 BRPM | SYSTOLIC BLOOD PRESSURE: 127 MMHG | HEIGHT: 56 IN | BODY MASS INDEX: 29.25 KG/M2 | HEART RATE: 76 BPM | TEMPERATURE: 97.7 F | DIASTOLIC BLOOD PRESSURE: 85 MMHG

## 2020-12-06 LAB
ABO + RH BLD: NORMAL
ABO + RH BLDCO: NORMAL
BLD PROD TYP BPU: NORMAL
BPU ID: NORMAL
CALLED TO:,BCALL1: NORMAL
FETAL SCREEN,FMHS: NORMAL
STATUS OF UNIT,%ST: NORMAL
UNIT DIVISION, %UDIV: 0

## 2020-12-06 NOTE — PROGRESS NOTES
192 Bedside and Verbal shift change report given to ISAURO Uriarte, RN (oncoming nurse) by Mehreen Salas. Jina Whyte, RN (offgoing nurse). Report included the following information SBAR, Kardex, Intake/Output, MAR and Recent Results. Mother holding . No needs expressed at this time, call bell within reach     2114 Rounding complete, mother holding . No needs expressed at this time, call bell within reach     2233 Rounding complete, mother breastfeeding      2335 Shift assessment complete. Patient denies headache, visual disturbances, and right epigastic pain at this time. Breath sounds clear bilaterally. Patient is passing gas, bowel sounds active, with last BM being 20. Fundus firm at U-1 with scant lochia, no clots noted on assessment. No edema in upper extremities, no in lower extremities. Patient free of clonus in lower extremities and denying any pain/tenderness behind knees or in calves. Patient rating pain 6/10. Patient educated to notify nursing staff of: SOB, chest pain/heaviness, blurred vision, lightheadedness, increase in bleeding, and passing of clots, patient verbalized understanding.  CNA notified nurse of temperature of 99.3. Motrin and Tylenol given for pain 6/10    0100 Rounding complete, mother holding . No needs expressed at this time, call bell within reach     0218 Rounding complete. Mother resting with eyes closed, chest rise and fall noted upon visual assessment, call bell within reach     0402 Rounding complete, mother breastfeeding . No needs expressed at this time, call bell within reach     0507 Rounding complete. Mother resting with eyes closed, chest rise and fall noted upon visual assessment, call bell within reach    0720 Bedside and Verbal shift change report given to BIBI Purcell LPN (oncoming nurse) by Dari Uriarte, RADHA (offgoing nurse). Report included the following information SBAR, Kardex, Intake/Output, MAR and Recent Results.

## 2020-12-06 NOTE — DISCHARGE INSTRUCTIONS
POST DELIVERY DISCHARGE INSTRUCTIONS    Name: Donald Stack  YOB: 1990  Primary Diagnosis: Principal Problem:    IUP (intrauterine pregnancy), incidental (12/4/2020)    Active Problems:    Uterine contractions during pregnancy (12/1/2020)      Rh negative status during pregnancy in third trimester (12/4/2020)      Rubella non-immune status, antepartum (12/4/2020)      Normal labor (12/4/2020)        General:     Diet/Diet Restrictions:  Eight 8-ounce glasses of fluid daily (water, juices); avoid excessive caffeine intake. Meals/snacks as desired which are high in fiber and carbohydrates and low in fat and cholesterol. Physical Activity / Restrictions / Safety:     Avoid heavy lifting, no more than the baby alone (not the baby in the car seat). Avoid intercourse until you are seen at your postpartum visit. No douching or tampon use. Check with obstetrician before starting or resuming an exercise program.         Discharge Instructions/Special Treatment/Home Care Needs:     Continue prenatal vitamins. Continue to use squirt bottle with warm water on your perineum after each bathroom use until bleeding stops. Call your doctor for the following:     Fever over 101 degrees by mouth. Vaginal bleeding that soaks two pads per hour for more than one hour. Red streaks or increased swelling of legs, painful red streaks on your breast.  If you feel extremely anxious or overwhelmed. If you have thoughts of harming yourself and/or your baby. Pain Management:     Pain Management:   Take Acetaminophen (Tylenol) or Ibuprofen (Advil, Motrin), as directed for pain. Use a warm Sitz bath 3 times daily to relieve perineal or hemorrhoidal discomfort. For hemorrhoidal discomfort, use Tucks and Anusol cream as needed and directed.     Follow-Up Care:     Appointment with MD:   Follow-up Appointments   Procedures    FOLLOW UP VISIT Appointment in: 6 Weeks     Standing Status:   Standing     Number of Occurrences:   1     Order Specific Question:   Appointment in     Answer:   6 Weeks     Telephone number: [phone]      Signed By: Delbert Merlin, CNM                                                                                                      Patient armband removed and given to patient to take home.   Patient was informed of the privacy risks if armband lost or stolen

## 2020-12-06 NOTE — PROGRESS NOTES
Assumed care of pt.  0810-assessment completed. Denies needs. 0850-breast feeding. 0915-eating breakfast.  Baby to nsy for sasha. 0616-VCZK out to room. Denies needs. 1030-discharge instructions reviewed with pt who verbalized understanding and signed. 1119-discharged home with infant via wheelchair.

## 2020-12-06 NOTE — PROGRESS NOTES
Problem: Pain  Goal: *Control of Pain  Outcome: Progressing Towards Goal     Problem: Patient Education: Go to Patient Education Activity  Goal: Patient/Family Education  Outcome: Progressing Towards Goal     Problem: Vaginal Delivery: Postpartum Day 1  Goal: Activity/Safety  Outcome: Progressing Towards Goal  Goal: Nutrition/Diet  Outcome: Progressing Towards Goal  Goal: Discharge Planning  Outcome: Progressing Towards Goal  Goal: Medications  Outcome: Progressing Towards Goal  Goal: Treatments/Interventions/Procedures  Outcome: Progressing Towards Goal  Goal: Psychosocial  Outcome: Progressing Towards Goal  Goal: *Vital signs within defined limits  Outcome: Progressing Towards Goal  Goal: *Labs within defined limits  Outcome: Progressing Towards Goal  Goal: *Hemodynamically stable  Outcome: Progressing Towards Goal  Goal: *Optimal pain control at patient's stated goal  Outcome: Progressing Towards Goal  Goal: *Participates in infant care  Outcome: Progressing Towards Goal  Goal: *Demonstrates progressive activity  Outcome: Progressing Towards Goal  Goal: *Performs self perineal care  Outcome: Progressing Towards Goal  Goal: *Appropriate parent-infant bonding  Outcome: Progressing Towards Goal  Goal: *Tolerating diet  Outcome: Progressing Towards Goal  Goal: *Performs self breast care  Outcome: Progressing Towards Goal

## 2020-12-06 NOTE — PROGRESS NOTES
Problem: Pain  Goal: *Control of Pain  Outcome: Resolved/Met     Problem: Lactation Care Plan  Goal: *Infant latching appropriately  Outcome: Resolved/Met  Goal: *Weight loss less than 10% of birth weight  Outcome: Resolved/Met     Problem: Patient Education: Go to Patient Education Activity  Goal: Patient/Family Education  Outcome: Resolved/Met     Problem: Patient Education: Go to Patient Education Activity  Goal: Patient/Family Education  Outcome: Resolved/Met     Problem: Vaginal Delivery: Postpartum Day 1  Goal: Activity/Safety  Outcome: Resolved/Met  Goal: Consults, if ordered  Outcome: Resolved/Met  Goal: Diagnostic Test/Procedures  Outcome: Resolved/Met  Goal: Nutrition/Diet  Outcome: Resolved/Met  Goal: Discharge Planning  Outcome: Resolved/Met  Goal: Medications  Outcome: Resolved/Met  Goal: Treatments/Interventions/Procedures  Outcome: Resolved/Met  Goal: Psychosocial  Outcome: Resolved/Met  Goal: *Vital signs within defined limits  Outcome: Resolved/Met  Goal: *Labs within defined limits  Outcome: Resolved/Met  Goal: *Hemodynamically stable  Outcome: Resolved/Met  Goal: *Optimal pain control at patient's stated goal  Outcome: Resolved/Met  Goal: *Participates in infant care  Outcome: Resolved/Met  Goal: *Demonstrates progressive activity  Outcome: Resolved/Met  Goal: *Performs self perineal care  Outcome: Resolved/Met  Goal: *Appropriate parent-infant bonding  Outcome: Resolved/Met  Goal: *Tolerating diet  Outcome: Resolved/Met  Goal: *Performs self breast care  Outcome: Resolved/Met     Problem: Vaginal Delivery: Postpartum 2  Goal: Activity/Safety  Outcome: Resolved/Met  Goal: Consults, if ordered  Outcome: Resolved/Met  Goal: Nutrition/Diet  Outcome: Resolved/Met  Goal: Discharge Planning  Outcome: Resolved/Met  Goal: Medications  Outcome: Resolved/Met  Goal: Treatments/Interventions/Procedures  Outcome: Resolved/Met  Goal: Psychosocial  Outcome: Resolved/Met     Problem: Vaginal Delivery: Discharge Outcomes  Goal: *Verbalizes name, dosage, time, side effects, and number of days to continue medications  Outcome: Resolved/Met  Goal: *Describes available resources and support systems  Outcome: Resolved/Met  Goal: *No signs and symptoms of infection  Outcome: Resolved/Met  Goal: *Birth certificate information completed  Outcome: Resolved/Met  Goal: *Received and verbalizes understanding of discharge plan and instructions  Outcome: Resolved/Met  Goal: *Vital signs within defined limits  Outcome: Resolved/Met  Goal: *Labs within defined limits  Outcome: Resolved/Met  Goal: *Hemodynamically stable  Outcome: Resolved/Met  Goal: *Optimal pain control at patient's stated goal  Outcome: Resolved/Met  Goal: *Participates in infant care  Outcome: Resolved/Met  Goal: *Demonstrates progressive activity  Outcome: Resolved/Met  Goal: *Appropriate parent-infant bonding  Outcome: Resolved/Met  Goal: *Tolerating diet  Outcome: Resolved/Met

## 2021-08-05 ENCOUNTER — HOSPITAL ENCOUNTER (EMERGENCY)
Age: 31
Discharge: HOME | End: 2021-08-05
Attending: EMERGENCY MEDICINE
Payer: COMMERCIAL

## 2021-08-05 VITALS
SYSTOLIC BLOOD PRESSURE: 150 MMHG | TEMPERATURE: 98.8 F | DIASTOLIC BLOOD PRESSURE: 90 MMHG | BODY MASS INDEX: 18.99 KG/M2 | OXYGEN SATURATION: 100 % | HEART RATE: 98 BPM | HEIGHT: 65 IN | WEIGHT: 114 LBS | RESPIRATION RATE: 18 BRPM

## 2021-08-05 DIAGNOSIS — R03.0 ELEVATED BLOOD PRESSURE READING: ICD-10-CM

## 2021-08-05 DIAGNOSIS — J06.9 ACUTE UPPER RESPIRATORY INFECTION: Primary | ICD-10-CM

## 2021-08-05 DIAGNOSIS — R11.10 POST-TUSSIVE EMESIS: ICD-10-CM

## 2021-08-05 PROCEDURE — 99282 EMERGENCY DEPT VISIT SF MDM: CPT

## 2021-08-05 RX ORDER — PROMETHAZINE HYDROCHLORIDE AND DEXTROMETHORPHAN HYDROBROMIDE 6.25; 15 MG/5ML; MG/5ML
5 SYRUP ORAL
Qty: 118 ML | Refills: 0 | Status: SHIPPED | OUTPATIENT
Start: 2021-08-05 | End: 2021-08-12

## 2021-08-05 NOTE — LETTER
NOTIFICATION RETURN TO WORK / SCHOOL    8/5/2021 3:03 PM    Ms. Amor Penaloza  [address] [address]      To Whom It May Concern:    Amor Penaloza is currently under the care of [facility] EMERGENCY DEPT. She will return to work/school on:  8/9/21    If there are questions or concerns please have the patient contact our office.         Sincerely,      Dr Lloyd Martinez

## 2021-08-05 NOTE — ED TRIAGE NOTES
Patient reports coughing since 2 weeks after camping.  Patient was tested for covid and was negative on tuesday

## 2021-08-05 NOTE — ED PROVIDER NOTES
EMERGENCY DEPARTMENT HISTORY AND PHYSICAL EXAM    1:07 PM      Date: 8/5/2021  Patient Name: Jay Peres    History of Presenting Illness     Chief Complaint   Patient presents with    Concern For [id number] (Coronavirus)         History Provided By: Patient    Additional History (Context): Jay Peres is a 31 y.o. female with Past medical history of sinusitis, gastroenteritis, BV, who presents with chief complaint of moderate cough for the past 2 weeks after camping. Associated symptoms are congestion, and posttussive emesis. She was concerned about COVID-19 but she had a negative Covid test 2 days ago. She states that her infant daughter also had a negative Covid. She had been seen at [facility] where she had a negative Covid test.  She also had a chest x-ray there that she states was negative. They did place her on doxycycline which she is taking now. She reports that the cough syrup over-the-counter is not helping. Nothing makes the symptoms better or worse. She denies any chest pain, fever, body aches, and no sore throat, numbness, weakness, dizziness, and no other complaints. LMP about 2 weeks ago. PCP: None        Past History     Past Medical History:  No past medical history on file. Past Surgical History:  No past surgical history on file. Family History:  No family history on file. Social History:  Social History     Tobacco Use    Smoking status: Former Smoker    Smokeless tobacco: Never Used   Substance Use Topics    Alcohol use: Never    Drug use: Not on file       Allergies: Allergies   Allergen Reactions    Latex Hives    Pineapple Swelling    Nickel Rash         Review of Systems       Review of Systems   Constitutional: Negative for appetite change, chills, diaphoresis and fever. HENT: Positive for congestion. Negative for rhinorrhea, sore throat and trouble swallowing. Eyes: Negative for visual disturbance. Respiratory: Positive for cough.  Negative for shortness of breath and wheezing. Posttussis emesis   Cardiovascular: Negative for chest pain, palpitations and leg swelling. Gastrointestinal: Negative for abdominal pain and nausea. Endocrine: Negative for polyuria. Genitourinary: Negative for dysuria and flank pain. Musculoskeletal: Negative for arthralgias, neck pain and neck stiffness. Skin: Negative for pallor and rash. Neurological: Negative for dizziness, weakness, numbness and headaches. Hematological: Does not bruise/bleed easily. Psychiatric/Behavioral: Negative for confusion and dysphoric mood. All other systems reviewed and are negative. Physical Exam     Visit Vitals  BP (!) 150/90   Pulse 98   Temp 98.8 °F (37.1 °C)   Resp 18   Ht 5' 5\" (1.651 m)   Wt 51.7 kg (114 lb)   SpO2 100%   BMI 18.97 kg/m²         Physical Exam  Vitals and nursing note reviewed. Constitutional:       General: She is not in acute distress. Appearance: She is well-developed. She is not ill-appearing, toxic-appearing or diaphoretic. HENT:      Head: Normocephalic and atraumatic. Eyes:      General: No scleral icterus. Conjunctiva/sclera: Conjunctivae normal.      Pupils: Pupils are equal, round, and reactive to light. Cardiovascular:      Rate and Rhythm: Normal rate. Pulses: Normal pulses. Comments: Capillary refill < 3 seconds  Pulmonary:      Effort: Pulmonary effort is normal. No respiratory distress. Breath sounds: Normal breath sounds. No stridor. No wheezing, rhonchi or rales. Comments: Active coughing at the bedside  Abdominal:      General: Bowel sounds are normal. There is no distension. Palpations: Abdomen is soft. Tenderness: There is no abdominal tenderness. Musculoskeletal:         General: No tenderness. Normal range of motion. Cervical back: Normal range of motion and neck supple. No tenderness. Skin:     General: Skin is warm and dry. Coloration: Skin is not pale. Neurological:      General: No focal deficit present. Mental Status: She is alert and oriented to person, place, and time. Cranial Nerves: No cranial nerve deficit. Sensory: No sensory deficit. Motor: No weakness. Coordination: Coordination normal.   Psychiatric:         Mood and Affect: Mood normal.         Thought Content: Thought content normal.           Diagnostic Study Results     Labs -  No results found for this or any previous visit (from the past 12 hour(s)). Radiologic Studies -   No orders to display         Medical Decision Making   I am the first provider for this patient. I reviewed the vital signs, available nursing notes, past medical history, past surgical history, family history and social history. Vital Signs-Reviewed the patient's vital signs. Pulse Oximetry Analysis -  100% on room air (Interpretation) normal    Cardiac Monitor:  Rate: 98  Rhythm:  Normal Sinus Rhythm     Records Reviewed: Nursing Notes and Old Medical Records (Time of Review: 1:07 PM)    Provider Notes (Medical Decision Making): DDx: Upper respiratory infection, posttussive emesis    Patient had negative Covid results. She states that over-the-counter cough syrup is not helping with her cough    We will give prescription for promethazine DM    Give note for off work    MDM    Medications - No data to display        ED Course: Progress Notes, Reevaluation, and Consults:  Patient with elevated blood pressure, but is asymptomatic. Per referral for PCP and to get repeat blood pressure check. Explained to her the importance of staying hydrated. I have reassessed the patient. I have discussed the workup, results and plan with the patient and patient is in agreement. Patient has no new complaints. Patient will be prescribed Promethazine DM. Patient was discharge in stable condition. Patient was given outpatient follow up.   Patient is to return to emergency department if any new or worsening condition. Diagnosis     Clinical Impression:   1. Acute upper respiratory infection    2. Post-tussive emesis    3. Elevated blood pressure reading        Disposition: Discharged    Follow-up Information     Follow up With Specialties Details Why Contact Info    [address] [place]  Schedule an appointment as soon as possible for a visit in 1 week Get a repeat blood pressure check [id number] [facility], [address] [address]  [phone]    [facility]  As needed, If symptoms worsen [address] [place] [address]  [phone]           Discharge Medication List as of 8/5/2021  2:49 PM      START taking these medications    Details   promethazine-dextromethorphan (PROMETHAZINE-DM) 6.25-15 mg/5 mL syrup Take 5 mL by mouth every six (6) hours as needed for Cough for up to 7 days. , Print, Disp-118 mL, R-0         CONTINUE these medications which have NOT CHANGED    Details   ibuprofen (MOTRIN) 800 mg tablet Take 1 Tab by mouth every eight (8) hours as needed for Pain., Normal, Disp-90 Tab,R-1      PNV 12-iron-methylfolate-dha 29 mg iron-1 mg -350 mg CKDR Take  by mouth., Historical Med               DO Dinora Islas medical dictation software was used for portions of this report. Unintended transcription errors may occur. My signature above authenticates this document and my orders, the final    diagnosis (es), discharge prescription (s), and instructions in the Epic    record.

## 2021-08-10 ENCOUNTER — APPOINTMENT (OUTPATIENT)
Dept: ULTRASOUND IMAGING | Age: 31
End: 2021-08-10
Attending: PHYSICIAN ASSISTANT
Payer: COMMERCIAL

## 2021-08-10 ENCOUNTER — HOSPITAL ENCOUNTER (EMERGENCY)
Age: 31
Discharge: HOME | End: 2021-08-10
Attending: EMERGENCY MEDICINE
Payer: COMMERCIAL

## 2021-08-10 VITALS
DIASTOLIC BLOOD PRESSURE: 85 MMHG | RESPIRATION RATE: 16 BRPM | BODY MASS INDEX: 25.64 KG/M2 | TEMPERATURE: 97.8 F | WEIGHT: 114 LBS | HEIGHT: 56 IN | OXYGEN SATURATION: 98 % | HEART RATE: 133 BPM | SYSTOLIC BLOOD PRESSURE: 118 MMHG

## 2021-08-10 DIAGNOSIS — R11.10 VOMITING, INTRACTABILITY OF VOMITING NOT SPECIFIED, PRESENCE OF NAUSEA NOT SPECIFIED, UNSPECIFIED VOMITING TYPE: Primary | ICD-10-CM

## 2021-08-10 LAB
ALBUMIN SERPL-MCNC: 4.3 G/DL (ref 3.4–5)
ALBUMIN/GLOB SERPL: 1 {RATIO} (ref 0.8–1.7)
ALP SERPL-CCNC: 120 U/L (ref 45–117)
ALT SERPL-CCNC: 54 U/L (ref 13–56)
ANION GAP SERPL CALC-SCNC: 9 MMOL/L (ref 3–18)
APPEARANCE UR: CLEAR
AST SERPL-CCNC: 64 U/L (ref 10–38)
ATRIAL RATE: 101 BPM
BACTERIA URNS QL MICRO: ABNORMAL /HPF
BASOPHILS # BLD: 0 K/UL (ref 0–0.1)
BASOPHILS NFR BLD: 0 % (ref 0–2)
BILIRUB SERPL-MCNC: 1.4 MG/DL (ref 0.2–1)
BILIRUB UR QL: ABNORMAL
BUN SERPL-MCNC: 14 MG/DL (ref 7–18)
BUN/CREAT SERPL: 12 (ref 12–20)
CALCIUM SERPL-MCNC: 10.4 MG/DL (ref 8.5–10.1)
CALCULATED P AXIS, ECG09: 58 DEGREES
CALCULATED R AXIS, ECG10: 65 DEGREES
CHLORIDE SERPL-SCNC: 98 MMOL/L (ref 100–111)
CO2 SERPL-SCNC: 32 MMOL/L (ref 21–32)
COLOR UR: ABNORMAL
CREAT SERPL-MCNC: 1.21 MG/DL (ref 0.6–1.3)
DIAGNOSIS, 93000: NORMAL
DIFFERENTIAL METHOD BLD: ABNORMAL
EOSINOPHIL # BLD: 0 K/UL (ref 0–0.4)
EOSINOPHIL NFR BLD: 0 % (ref 0–5)
EPITH CASTS URNS QL MICRO: ABNORMAL /LPF (ref 0–5)
ERYTHROCYTE [DISTWIDTH] IN BLOOD BY AUTOMATED COUNT: 12.9 % (ref 11.6–14.5)
GLOBULIN SER CALC-MCNC: 4.1 G/DL (ref 2–4)
GLUCOSE SERPL-MCNC: 126 MG/DL (ref 74–99)
GLUCOSE UR STRIP.AUTO-MCNC: NEGATIVE MG/DL
HCG UR QL: NEGATIVE
HCT VFR BLD AUTO: 43.9 % (ref 35–45)
HGB BLD-MCNC: 14.5 G/DL (ref 12–16)
HGB UR QL STRIP: NEGATIVE
KETONES UR QL STRIP.AUTO: 15 MG/DL
LEUKOCYTE ESTERASE UR QL STRIP.AUTO: ABNORMAL
LYMPHOCYTES # BLD: 1.9 K/UL (ref 0.9–3.6)
LYMPHOCYTES NFR BLD: 27 % (ref 21–52)
MCH RBC QN AUTO: 30.9 PG (ref 24–34)
MCHC RBC AUTO-ENTMCNC: 33 G/DL (ref 31–37)
MCV RBC AUTO: 93.6 FL (ref 74–97)
MONOCYTES # BLD: 0.7 K/UL (ref 0.05–1.2)
MONOCYTES NFR BLD: 10 % (ref 3–10)
MUCOUS THREADS URNS QL MICRO: ABNORMAL /LPF
NEUTS SEG # BLD: 4.3 K/UL (ref 1.8–8)
NEUTS SEG NFR BLD: 62 % (ref 40–73)
NITRITE UR QL STRIP.AUTO: NEGATIVE
P-R INTERVAL, ECG05: 128 MS
PH UR STRIP: 8 [PH] (ref 5–8)
PLATELET # BLD AUTO: 443 K/UL (ref 135–420)
PMV BLD AUTO: 8.2 FL (ref 9.2–11.8)
POTASSIUM SERPL-SCNC: 3.6 MMOL/L (ref 3.5–5.5)
PROT SERPL-MCNC: 8.4 G/DL (ref 6.4–8.2)
PROT UR STRIP-MCNC: 300 MG/DL
Q-T INTERVAL, ECG07: 326 MS
QRS DURATION, ECG06: 88 MS
QTC CALCULATION (BEZET), ECG08: 422 MS
RBC # BLD AUTO: 4.69 M/UL (ref 4.2–5.3)
RBC #/AREA URNS HPF: ABNORMAL /HPF (ref 0–5)
SODIUM SERPL-SCNC: 139 MMOL/L (ref 136–145)
SP GR UR REFRACTOMETRY: >1.03 (ref 1–1.03)
UROBILINOGEN UR QL STRIP.AUTO: 2 EU/DL (ref 0.2–1)
VENTRICULAR RATE, ECG03: 101 BPM
WBC # BLD AUTO: 7 K/UL (ref 4.6–13.2)
WBC URNS QL MICRO: ABNORMAL /HPF (ref 0–5)

## 2021-08-10 PROCEDURE — 85025 COMPLETE CBC W/AUTO DIFF WBC: CPT

## 2021-08-10 PROCEDURE — 76705 ECHO EXAM OF ABDOMEN: CPT

## 2021-08-10 PROCEDURE — 81025 URINE PREGNANCY TEST: CPT

## 2021-08-10 PROCEDURE — 93005 ELECTROCARDIOGRAM TRACING: CPT

## 2021-08-10 PROCEDURE — 80053 COMPREHEN METABOLIC PANEL: CPT

## 2021-08-10 PROCEDURE — 81001 URINALYSIS AUTO W/SCOPE: CPT

## 2021-08-10 PROCEDURE — 99285 EMERGENCY DEPT VISIT HI MDM: CPT

## 2021-08-10 RX ORDER — ONDANSETRON 4 MG/1
4 TABLET, ORALLY DISINTEGRATING ORAL
Qty: 20 TABLET | Refills: 0 | OUTPATIENT
Start: 2021-08-10 | End: 2022-06-23

## 2021-08-10 NOTE — LETTER
[facility] [place]  [facility]  [address] [facility] NEWS [address] [facility] [id number] [phone]    Work/School Note    Date: 8/10/2021    To Whom It May concern:    Lilibeth House was seen and treated today in the emergency room by the following provider(s):  Attending Provider: Rolf Cole MD  Physician Assistant: AXEL Phillips. Lilibeth House may return to work on 8/11/21.     Sincerely,          AXEL Berry

## 2021-08-11 NOTE — ED PROVIDER NOTES
EMERGENCY DEPARTMENT HISTORY AND PHYSICAL EXAM    Date: 8/10/2021  Patient Name: Jay Peres    History of Presenting Illness     No chief complaint on file. History Provided By: Patient    Chief Complaint: nausea and vomiting     Additional History (Context):   8:21 PM  Jay Peres is a 31 y.o. female presents to the emergency department C/O  with nausea vomiting and sore throat that been going on for the past several days. She was recently seen and had a work note written that covered her until 8/9. She missed work today due to persistent vomiting and was told that she needs to get another work note. Patient was tachycardic upon arrival.  States she has been vomiting today but is starting to feel better. Denies chest pain or shortness of breath. She does have sore throat with states is only from vomiting. No fevers. No cough. PCP: None    Current Outpatient Medications   Medication Sig Dispense Refill    ondansetron (Zofran ODT) 4 mg disintegrating tablet 1 Tablet by SubLINGual route every eight (8) hours as needed for Nausea or Vomiting. 20 Tablet 0    ondansetron (Zofran ODT) 4 mg disintegrating tablet 1 Tablet by SubLINGual route every eight (8) hours as needed for Nausea or Vomiting. 20 Tablet 0    ibuprofen (MOTRIN) 800 mg tablet Take 1 Tab by mouth every eight (8) hours as needed for Pain. 90 Tab 1    PNV 12-iron-methylfolate-dha 29 mg iron-1 mg -350 mg CKDR Take  by mouth. Past History     Past Medical History:  History reviewed. No pertinent past medical history. Past Surgical History:  History reviewed. No pertinent surgical history. Family History:  History reviewed. No pertinent family history. Social History:  Social History     Tobacco Use    Smoking status: Former Smoker    Smokeless tobacco: Never Used   Substance Use Topics    Alcohol use: Never    Drug use: Never       Allergies:   Allergies   Allergen Reactions    Latex Hives    Pineapple Swelling    Nickel Rash       Review of Systems   Review of Systems   Constitutional: Negative for chills and fever. Respiratory: Negative for shortness of breath. Cardiovascular: Negative for chest pain. Gastrointestinal: Positive for nausea and vomiting. Negative for abdominal pain and diarrhea. Musculoskeletal: Negative for back pain and neck pain. Neurological: Negative for weakness and numbness. All other systems reviewed and are negative. Physical Exam     Vitals:    08/10/21 2130 08/10/21 2145 08/10/21 2200 08/10/21 2300   BP: 108/75 109/73 105/84 118/85   Pulse:       Resp:       Temp:       SpO2: 100% 100% 100% 98%   Weight:       Height:         Physical Exam  Vitals and nursing note reviewed. Constitutional:       Appearance: She is well-developed. Comments: Alert, lying on stretcher, nontoxic, no acute distress, no active vomiting   HENT:      Head: Normocephalic and atraumatic. Cardiovascular:      Rate and Rhythm: Normal rate and regular rhythm. Heart sounds: Normal heart sounds. No murmur heard. Pulmonary:      Effort: Pulmonary effort is normal. No respiratory distress. Breath sounds: Normal breath sounds. No wheezing or rales. Abdominal:      General: Bowel sounds are normal.      Palpations: Abdomen is soft. Tenderness: There is generalized abdominal tenderness. There is no right CVA tenderness or left CVA tenderness. Musculoskeletal:      Cervical back: Normal range of motion and neck supple. Neurological:      Mental Status: She is alert and oriented to person, place, and time. Psychiatric:         Judgment: Judgment normal.         Diagnostic Study Results     Labs:     No results found for this or any previous visit (from the past 12 hour(s)). Radiologic Studies:   US GALLBLADDER   Final Result      1. No gallstones or evidence of acute cholecystitis.       2. Increased hepatic echogenicity, nonspecific, though most commonly seen in the   setting of hepatic steatosis. CT Results  (Last 48 hours)    None        CXR Results  (Last 48 hours)    None          Medical Decision Making   I am the first provider for this patient. I reviewed the vital signs, available nursing notes, past medical history, past surgical history, family history and social history. Vital Signs: Reviewed the patient's vital signs. Pulse Oximetry Analysis: 100% on RA     Cardiac Monitor:  Rate: 70  bpm  Rhythm: NSR    EKG interpretation: (Preliminary)  8:21 PM   Sinus tachycardia rate 101 bpm no STEMI      Records Reviewed: Nursing Notes and Old Medical Records    Procedures:  Procedures    ED Course:   8:21 PM Initial assessment performed. The patients presenting problems have been discussed, and they are in agreement with the care plan formulated and outlined with them. I have encouraged them to ask questions as they arise throughout their visit. Discussion:  Pt presents with nausea and vomiting this been going on for about a week. Patient was seen in this ED and evaluated. Note was written but she was unable to go back due to vomiting. On exam she is well-appearing nontoxic and in no acute distress. Labs show slight elevation in LFTs and bili therefore ultrasound obtained of gallbladder. .  Findings of acute cholecystitis or gallstones but does have some findings of hepatic steatosis. Will have patient follow-up with her doctor. Patient no longer tachycardic after IV hydration. Strict return precautions given, pt offering no questions or complaints. Diagnosis and Disposition     DISCHARGE NOTE:    Denisejacqui Barraza  results have been reviewed with her. She has been counseled regarding her diagnosis, treatment, and plan. She verbally conveys understanding and agreement of the signs, symptoms, diagnosis, treatment and prognosis and additionally agrees to follow up as discussed.   She also agrees with the care-plan and conveys that all of her questions have been answered. I have also provided discharge instructions for her that include: educational information regarding their diagnosis and treatment, and list of reasons why they would want to return to the ED prior to their follow-up appointment, should her condition change. She has been provided with education for proper emergency department utilization. CLINICAL IMPRESSION:    1. Vomiting, intractability of vomiting not specified, presence of nausea not specified, unspecified vomiting type        PLAN:  1. D/C Home  2. Discharge Medication List as of 8/10/2021 11:28 PM      START taking these medications    Details   ondansetron (Zofran ODT) 4 mg disintegrating tablet 1 Tablet by SubLINGual route every eight (8) hours as needed for Nausea or Vomiting., Normal, Disp-20 Tablet, R-0         CONTINUE these medications which have NOT CHANGED    Details   promethazine-dextromethorphan (PROMETHAZINE-DM) 6.25-15 mg/5 mL syrup Take 5 mL by mouth every six (6) hours as needed for Cough for up to 7 days. , Print, Disp-118 mL, R-0      ibuprofen (MOTRIN) 800 mg tablet Take 1 Tab by mouth every eight (8) hours as needed for Pain., Normal, Disp-90 Tab,R-1      PNV 12-iron-methylfolate-dha 29 mg iron-1 mg -350 mg CKDR Take  by mouth., Historical Med           3. Follow-up Information     Follow up With Specialties Details Why Contact Info    [address] [place]  Schedule an appointment as soon as possible for a visit   [address] [facility], [address] [facility] EMERGENCY DEPT Emergency Medicine  If symptoms worsen [address] [place] [address]  [phone]                 Please note that this dictation was completed with [employer], the [employer] voice recognition software. Quite often unanticipated grammatical, syntax, homophones, and other interpretive errors are inadvertently transcribed by the computer software. Please disregard these errors.   Please excuse any errors that have escaped final proofreading.

## 2021-10-17 ENCOUNTER — HOSPITAL ENCOUNTER (EMERGENCY)
Age: 31
Discharge: HOME | End: 2021-10-17
Attending: EMERGENCY MEDICINE
Payer: COMMERCIAL

## 2021-10-17 VITALS
WEIGHT: 114 LBS | TEMPERATURE: 98.3 F | BODY MASS INDEX: 25.64 KG/M2 | HEART RATE: 104 BPM | RESPIRATION RATE: 18 BRPM | HEIGHT: 56 IN | SYSTOLIC BLOOD PRESSURE: 151 MMHG | OXYGEN SATURATION: 99 % | DIASTOLIC BLOOD PRESSURE: 77 MMHG

## 2021-10-17 DIAGNOSIS — M62.830 BACK SPASM: Primary | ICD-10-CM

## 2021-10-17 LAB
HCG UR QL: NEGATIVE
HCG UR QL: NEGATIVE

## 2021-10-17 PROCEDURE — 99283 EMERGENCY DEPT VISIT LOW MDM: CPT

## 2021-10-17 PROCEDURE — 81025 URINE PREGNANCY TEST: CPT

## 2021-10-17 RX ORDER — CYCLOBENZAPRINE HCL 10 MG
10 TABLET ORAL
Qty: 15 TABLET | Refills: 0 | OUTPATIENT
Start: 2021-10-17 | End: 2022-06-23

## 2021-10-17 RX ORDER — IBUPROFEN 800 MG/1
800 TABLET ORAL
Qty: 20 TABLET | Refills: 0 | Status: SHIPPED | OUTPATIENT
Start: 2021-10-17 | End: 2021-10-24

## 2021-10-17 NOTE — LETTER
[facility] [place]  [facility]  [address]  [facility] [address] [phone]    Work/School Note    Date: 10/17/2021    To Whom It May concern:    Obdulia Bolton was seen and treated today in the emergency room by the following provider(s):  Attending Provider: Katalina Strickland MD  Physician Assistant: AXEL Ortega. Obdulia Bolton may return to work on 10/19/21.     Sincerely,          AXEL Leija

## 2021-10-18 NOTE — ED TRIAGE NOTES
Pt presents to ED with c/o lower back pain after reaching down to  laundry basket this morning. States she will need a work note for tomorrow.

## 2021-10-18 NOTE — ED NOTES
CC:  Sore Throat (Pt c/o sore throat, congestion x 3 wks. )      Hx of CC:  FORMERLY SEEN FOR RHINITIS AND COUGH (STILL PRESENT), GOT VULVAR ITCHING/RASH POST AUGMENTIN COURSE FROM WALK[facility]IN CLINIC.       Vitals:    09/17/19  1614   BP: 120/68   Pulse: 77   Sp I have reviewed discharge instructions with the patient. The patient verbalized understanding. Patient armband removed and shredded

## 2021-10-18 NOTE — ED PROVIDER NOTES
EMERGENCY DEPARTMENT HISTORY AND PHYSICAL EXAM    Date: 10/17/2021  Patient Name: Cem Montesinos    History of Presenting Illness     Chief Complaint   Patient presents with    Back Pain         History Provided By: Patient    Chief Complaint: back pain       Additional History (Context):   8:17 PM  Cem Montesinos is a 36 y.o. female presents to the emergency department C/O left-sided back pain that started earlier today when she was bending over to  a laundry basket. No tingling numbness weakness in the leg. Pain does not radiate into the leg. No urinary symptoms. Denies any chance of pregnancy. No abdominal pain. States she tried taking ibuprofen and Flexeril today without relief and is requesting work note     PCP: None    Current Outpatient Medications   Medication Sig Dispense Refill    ibuprofen (MOTRIN) 800 mg tablet Take 1 Tablet by mouth every six (6) hours as needed for Pain for up to 7 days. 20 Tablet 0    cyclobenzaprine (FLEXERIL) 10 mg tablet Take 1 Tablet by mouth three (3) times daily as needed for Muscle Spasm(s). 15 Tablet 0    ondansetron (Zofran ODT) 4 mg disintegrating tablet 1 Tablet by SubLINGual route every eight (8) hours as needed for Nausea or Vomiting. 20 Tablet 0    ondansetron (Zofran ODT) 4 mg disintegrating tablet 1 Tablet by SubLINGual route every eight (8) hours as needed for Nausea or Vomiting. 20 Tablet 0    PNV 12-iron-methylfolate-dha 29 mg iron-1 mg -350 mg CKDR Take  by mouth. Past History     Past Medical History:  History reviewed. No pertinent past medical history. Past Surgical History:  History reviewed. No pertinent surgical history. Family History:  History reviewed. No pertinent family history. Social History:  Social History     Tobacco Use    Smoking status: Former Smoker    Smokeless tobacco: Never Used   Substance Use Topics    Alcohol use: Never    Drug use: Never       Allergies:   Allergies   Allergen Reactions    Latex Hives  Pineapple Swelling    Nickel Rash       Review of Systems   Review of Systems   Constitutional: Negative for chills and fever. Respiratory: Negative for shortness of breath. Cardiovascular: Negative for chest pain. Gastrointestinal: Negative for abdominal pain, diarrhea, nausea and vomiting. Genitourinary: Negative for decreased urine volume, difficulty urinating, dyspareunia, dysuria, enuresis, flank pain, frequency, hematuria, pelvic pain, urgency, vaginal bleeding, vaginal discharge and vaginal pain. Musculoskeletal: Positive for back pain. Neurological: Negative for weakness and numbness. All other systems reviewed and are negative. Physical Exam     Vitals:    10/17/21 2014   BP: (!) 151/77   Pulse: (!) 104   Resp: 18   Temp: 98.3 °F (36.8 °C)   SpO2: 99%   Weight: 51.7 kg (114 lb)   Height: 4' 8\" (1.422 m)     Physical Exam  Vitals and nursing note reviewed. Constitutional:       Appearance: She is well-developed. Comments: Alert sitting up on chair in fast track room   HENT:      Head: Normocephalic and atraumatic. Cardiovascular:      Rate and Rhythm: Normal rate and regular rhythm. Heart sounds: Normal heart sounds. No murmur heard. Pulmonary:      Effort: Pulmonary effort is normal. No respiratory distress. Breath sounds: Normal breath sounds. No wheezing or rales. Abdominal:      General: Bowel sounds are normal.      Palpations: Abdomen is soft. Tenderness: There is no abdominal tenderness. There is no right CVA tenderness or left CVA tenderness. Musculoskeletal:      Cervical back: Normal range of motion and neck supple. Back:    Skin:     General: Skin is warm and dry. Neurological:      Mental Status: She is alert and oriented to person, place, and time.    Psychiatric:         Judgment: Judgment normal.           Diagnostic Study Results     Labs:     Recent Results (from the past 12 hour(s))   HCG URINE, QL    Collection Time: 10/17/21 8:27 PM   Result Value Ref Range    HCG urine, QL Negative NEG     HCG URINE, QL. - POC    Collection Time: 10/17/21  8:31 PM   Result Value Ref Range    Pregnancy test,urine (POC) Negative NEG         Radiologic Studies:   No orders to display     CT Results  (Last 48 hours)    None        CXR Results  (Last 48 hours)    None          Medical Decision Making   I am the first provider for this patient. I reviewed the vital signs, available nursing notes, past medical history, past surgical history, family history and social history. Vital Signs: Reviewed the patient's vital signs. Pulse Oximetry Analysis: 99% on RA     Records Reviewed: Nursing Notes and Old Medical Records    Procedures:  Procedures    ED Course:   8:17 PM Initial assessment performed. The patients presenting problems have been discussed, and they are in agreement with the care plan formulated and outlined with them. I have encouraged them to ask questions as they arise throughout their visit. Discussion:  Pt presents with left sided paraspinal muscles tenderness this started when she bent over earlier today. No red flag signs or symptoms. No bony tenderness. No imaging indicated at this time suspect muscular set spasm will treat with muscle relaxer and anti-inflammatory patient requesting work note. Strict return precautions given, pt offering no questions or complaints. Diagnosis and Disposition     DISCHARGE NOTE:  Gregorio Morillo  results have been reviewed with her. She has been counseled regarding her diagnosis, treatment, and plan. She verbally conveys understanding and agreement of the signs, symptoms, diagnosis, treatment and prognosis and additionally agrees to follow up as discussed. She also agrees with the care-plan and conveys that all of her questions have been answered.   I have also provided discharge instructions for her that include: educational information regarding their diagnosis and treatment, and list of reasons why they would want to return to the ED prior to their follow-up appointment, should her condition change. She has been provided with education for proper emergency department utilization. CLINICAL IMPRESSION:    1. Back spasm        PLAN:  1. D/C Home  2. Discharge Medication List as of 10/17/2021  9:01 PM        3. Follow-up Information     Follow up With Specialties Details Why Contact Info    Daiana Ortiz MD Orthopedic Surgery Schedule an appointment as soon as possible for a visit   [address] [facility] [address]  [phone]      [facility] EMERGENCY DEPT Emergency Medicine  If symptoms worsen [address] [place] [address]  [phone]                 Please note that this dictation was completed with [employer], the computer voice recognition software. Quite often unanticipated grammatical, syntax, homophones, and other interpretive errors are inadvertently transcribed by the computer software. Please disregard these errors. Please excuse any errors that have escaped final proofreading.

## 2021-11-18 ENCOUNTER — HOSPITAL ENCOUNTER (EMERGENCY)
Age: 31
Discharge: HOME | End: 2021-11-18
Attending: EMERGENCY MEDICINE
Payer: COMMERCIAL

## 2021-11-18 VITALS
DIASTOLIC BLOOD PRESSURE: 66 MMHG | TEMPERATURE: 98.9 F | SYSTOLIC BLOOD PRESSURE: 118 MMHG | OXYGEN SATURATION: 99 % | HEART RATE: 118 BPM | RESPIRATION RATE: 16 BRPM

## 2021-11-18 DIAGNOSIS — J06.9 VIRAL URI WITH COUGH: ICD-10-CM

## 2021-11-18 DIAGNOSIS — H66.012 NON-RECURRENT ACUTE SUPPURATIVE OTITIS MEDIA OF LEFT EAR WITH SPONTANEOUS RUPTURE OF TYMPANIC MEMBRANE: Primary | ICD-10-CM

## 2021-11-18 PROCEDURE — 74011250637 HC RX REV CODE- 250/637: Performed by: EMERGENCY MEDICINE

## 2021-11-18 PROCEDURE — 99283 EMERGENCY DEPT VISIT LOW MDM: CPT

## 2021-11-18 PROCEDURE — 96372 THER/PROPH/DIAG INJ SC/IM: CPT

## 2021-11-18 PROCEDURE — 74011250636 HC RX REV CODE- 250/636: Performed by: EMERGENCY MEDICINE

## 2021-11-18 RX ORDER — AMOXICILLIN AND CLAVULANATE POTASSIUM 875; 125 MG/1; MG/1
1 TABLET, FILM COATED ORAL
Status: COMPLETED | OUTPATIENT
Start: 2021-11-18 | End: 2021-11-18

## 2021-11-18 RX ORDER — BENZONATATE 100 MG/1
200 CAPSULE ORAL ONCE
Status: COMPLETED | OUTPATIENT
Start: 2021-11-18 | End: 2021-11-18

## 2021-11-18 RX ORDER — KETOROLAC TROMETHAMINE 30 MG/ML
30 INJECTION, SOLUTION INTRAMUSCULAR; INTRAVENOUS
Status: COMPLETED | OUTPATIENT
Start: 2021-11-18 | End: 2021-11-18

## 2021-11-18 RX ORDER — AMOXICILLIN AND CLAVULANATE POTASSIUM 875; 125 MG/1; MG/1
1 TABLET, FILM COATED ORAL EVERY 12 HOURS
Status: DISCONTINUED | OUTPATIENT
Start: 2021-11-18 | End: 2021-11-18

## 2021-11-18 RX ORDER — AMOXICILLIN AND CLAVULANATE POTASSIUM 875; 125 MG/1; MG/1
1 TABLET, FILM COATED ORAL 2 TIMES DAILY
Qty: 14 TABLET | Refills: 0 | Status: SHIPPED | OUTPATIENT
Start: 2021-11-18 | End: 2021-11-25

## 2021-11-18 RX ORDER — ONDANSETRON 4 MG/1
4 TABLET, ORALLY DISINTEGRATING ORAL
Status: COMPLETED | OUTPATIENT
Start: 2021-11-18 | End: 2021-11-18

## 2021-11-18 RX ORDER — BENZONATATE 100 MG/1
100 CAPSULE ORAL
Qty: 15 CAPSULE | Refills: 0 | Status: SHIPPED | OUTPATIENT
Start: 2021-11-18 | End: 2021-11-23

## 2021-11-18 RX ORDER — HYDROCODONE BITARTRATE AND ACETAMINOPHEN 5; 325 MG/1; MG/1
1 TABLET ORAL
Qty: 8 TABLET | Refills: 0 | Status: SHIPPED | OUTPATIENT
Start: 2021-11-18 | End: 2021-11-21

## 2021-11-18 RX ADMIN — KETOROLAC TROMETHAMINE 30 MG: 30 INJECTION, SOLUTION INTRAMUSCULAR at 03:22

## 2021-11-18 RX ADMIN — ONDANSETRON 4 MG: 4 TABLET, ORALLY DISINTEGRATING ORAL at 03:23

## 2021-11-18 RX ADMIN — AMOXICILLIN AND CLAVULANATE POTASSIUM 1 TABLET: 875; 125 TABLET, FILM COATED ORAL at 03:23

## 2021-11-18 RX ADMIN — BENZONATATE 200 MG: 100 CAPSULE ORAL at 03:22

## 2021-11-18 NOTE — Clinical Note
[facility] [place]  [facility] EMERGENCY DEPT  [address] [facility] [address] [phone]    Work/School Note    Date: 11/18/2021    To Whom It May concern:    Consuelo Sanchez was seen and treated today in the emergency room by the following provider(s):  Attending Provider: Monse Perez is excused from work/school on 11/18/21 and 11/19/21. She is medically clear to return to work/school on 11/20/2021.        Sincerely,          Erika Lefort, DO

## 2021-11-18 NOTE — ED PROVIDER NOTES
EMERGENCY DEPARTMENT HISTORY AND PHYSICAL EXAM    3:19 AM    Date: 11/18/2021  Patient Name: Frank Jimenez    History of Presenting Illness     Chief Complaint   Patient presents with    Ear Pain       History Provided By: Patient  Location/Duration/Severity/Modifying factors   Patient is a 21-year-old female with no ongoing medical history presents to emergency department with a chief left-sided ear pain. Reports that for the past approximately 5 or 6 days she has felt ill she is had a cough runny nose congestion has not had ear pain however. She also had a mild sore throat. Patient [facility] had [facility] worsening of pain and development of pain in her left ear. Worsens with coughing, swallowing certain movements. She is not noted any drainage. She is not pregnant or breast-feeding per patient. She was tested for Covid at urgent care 3 days ago and reportedly was negative. Rates the pain is severe. She is also having frequent coughing leading to posttussive emesis. Her child has also been sick with similar symptoms. She is not swimming has not put anything in the ear. PCP: None    Current Facility-Administered Medications   Medication Dose Route Frequency Provider Last Rate Last Admin    benzonatate (TESSALON) capsule 200 mg  200 mg Oral ONCE Ashwin Reza, DO        ketorolac tromethamine (TORADOL) 60 mg/2 mL injection 30 mg  30 mg IntraMUSCular NOW Noah Martinez, DO        amoxicillin-clavulanate (AUGMENTIN) 875-125 mg per tablet 1 Tablet  1 Tablet Oral NOW Noah Martinez, DO         Current Outpatient Medications   Medication Sig Dispense Refill    amoxicillin-clavulanate (Augmentin) 875-125 mg per tablet Take 1 Tablet by mouth two (2) times a day for 7 days. 14 Tablet 0    benzonatate (Tessalon Perles) 100 mg capsule Take 1 Capsule by mouth three (3) times daily as needed for Cough for up to 5 days.  You must keep this away from any small children as it is toxic to small children. 15 Capsule 0    HYDROcodone-acetaminophen (Norco) 5-325 mg per tablet Take 1 Tablet by mouth every six (6) hours as needed for Pain for up to 3 days. Max Daily Amount: 4 Tablets. 8 Tablet 0    cyclobenzaprine (FLEXERIL) 10 mg tablet Take 1 Tablet by mouth three (3) times daily as needed for Muscle Spasm(s). 15 Tablet 0    ondansetron (Zofran ODT) 4 mg disintegrating tablet 1 Tablet by SubLINGual route every eight (8) hours as needed for Nausea or Vomiting. 20 Tablet 0    ondansetron (Zofran ODT) 4 mg disintegrating tablet 1 Tablet by SubLINGual route every eight (8) hours as needed for Nausea or Vomiting. 20 Tablet 0    PNV 12-iron-methylfolate-dha 29 mg iron-1 mg -350 mg CKDR Take  by mouth. Past History     Past Medical History:  No past medical history on file. Past Surgical History:  No past surgical history on file. Family History:  No family history on file. Social History:  Social History     Tobacco Use    Smoking status: Former Smoker    Smokeless tobacco: Never Used   Substance Use Topics    Alcohol use: Never    Drug use: Never       Allergies: Allergies   Allergen Reactions    Latex Hives    Pineapple Swelling    Nickel Rash       I reviewed and confirmed the above information with patient and updated as necessary. Review of Systems     Review of Systems   Constitutional: Negative for chills and fever. HENT: Positive for ear pain, rhinorrhea, sinus pressure and sore throat. Negative for congestion and sneezing. Eyes: Negative for visual disturbance. Respiratory: Positive for cough. Negative for shortness of breath. Cardiovascular: Negative for chest pain. Gastrointestinal: Negative for abdominal pain, diarrhea, nausea and vomiting. Genitourinary: Negative for dysuria, frequency and urgency. Musculoskeletal: Negative for back pain and neck pain. Skin: Negative for rash.    Neurological: Negative for syncope, numbness and headaches. Physical Exam     Visit Vitals  /66   Pulse (!) 118   Temp 98.9 °F (37.2 °C)   Resp 16   SpO2 99%       Physical Exam  Vitals and nursing note reviewed. Constitutional:       General: She is not in acute distress. Appearance: Normal appearance. She is normal weight. She is not ill-appearing or toxic-appearing. Comments: Frequent coughing   HENT:      Head: Normocephalic and atraumatic. Right Ear: Tympanic membrane and external ear normal. There is no impacted cerumen. Left Ear: External ear normal. There is no impacted cerumen. Ears:      Comments: The left tympanic membrane is grossly erythematous with moderate purulent appearing effusion, slightly bulging, there is an area at approximately the 11 o'clock position that is concerning for possibly a small area of rupture somewhat obscured by the surrounding redness. Nose: Nose normal. No congestion or rhinorrhea. Mouth/Throat:      Mouth: Mucous membranes are moist.      Pharynx: Oropharynx is clear. No oropharyngeal exudate or posterior oropharyngeal erythema. Comments: 3+ tonsils, mild erythema with no exudate, no PTA, no uvular deviation no trismus  Eyes:      Conjunctiva/sclera: Conjunctivae normal.      Pupils: Pupils are equal, round, and reactive to light. Cardiovascular:      Rate and Rhythm: Normal rate and regular rhythm. Pulses: Normal pulses. Heart sounds: Normal heart sounds. No murmur heard. Pulmonary:      Effort: Pulmonary effort is normal.      Breath sounds: Normal breath sounds. No wheezing, rhonchi or rales. Abdominal:      General: Abdomen is flat. Tenderness: There is no abdominal tenderness. There is no guarding or rebound. Musculoskeletal:         General: No swelling or tenderness. Normal range of motion. Cervical back: Normal range of motion and neck supple. Right lower leg: No edema. Left lower leg: No edema.    Skin: General: Skin is warm and dry. Capillary Refill: Capillary refill takes less than 2 seconds. Findings: No rash. Neurological:      General: No focal deficit present. Mental Status: She is alert. Diagnostic Study Results     Labs -  No results found for this or any previous visit (from the past 24 hour(s)). Radiologic Studies -   No orders to display           Medical Decision Making   I am the first provider for this patient. I reviewed the vital signs, available nursing notes, past medical history, past surgical history, family history and social history. Vital Signs-Reviewed the patient's vital signs. Records Reviewed: Nursing Notes, Old Medical Records, Previous Radiology Studies and Previous Laboratory Studies (Time of Review: 3:19 AM)      Provider Notes (Medical Decision Making):   MDM  Number of Diagnoses or Management Options  Non-recurrent acute suppurative otitis media of left ear with spontaneous rupture of tympanic membrane  Viral URI with cough  Diagnosis management comments: 26-year-old female presenting with left ear pain. She had preceding viral URI symptoms. On exam she definitely has otitis media, there is a questionable area at around the 11:00 to 10 o'clock position that may be a small rupture. She had poor tolerance of the exam.  We will treat her with Augmentin and pain medicine. She has no mastoid tenderness or concerning signs for mastoiditis no signs of otitis externa. There is no drainage I think patient would be suitable for treatment with just p.o. antibiotics. Provided guidance on avoidance of any water in the ear avoidance of anything that makes the pain worse. We will have her follow-up with ENT and/or PCP and advised to return if worse in the meantime. Suspect the primary issue was initially a viral URI that led to the otitis media superinfection. Patient's Covid test done a few days ago was negative.     At this time, patient is stable and appropriate for discharge home.  Patient demonstrates understanding of current diagnoses and is in agreement with the treatment plan. Paris Nassar are advised that while the likelihood of serious underlying condition is low at this point given the evaluation performed today, we cannot fully rule it out. Paris Hernandezon are advised to immediately return with any new symptoms or worsening of current condition. Any Incidental findings were noted on the patient's discharge paperwork as well as verbally directly to the patient, and the appropriate follow-up was given to the patient as far as instructions on testing needed as well as the timeframe.  All questions have been answered. Jessica Hardin is given educational material regarding their diagnoses, including danger symptoms and when to return to the ED. This note was dictated utilizing Dragon voice recognition software. Unfortunately this leads to occasional typographical errors. I apologize in advance if the situation occurs. If questions occur please do not hesitate to contact me directly. Javid Oh DO          ED Course: Progress Notes, Reevaluation, and Consults:  ED Course as of 11/18/21 0319   Thu Nov 18, 2021   0305 PT denies any change of pregnancy or breast feeding [KEATON]      ED Course User Index  [KEATON] Romeo Carlton DO       Procedures    Critical Care Time: n/A    Diagnosis     Clinical Impression:   1. Non-recurrent acute suppurative otitis media of left ear with spontaneous rupture of tympanic membrane    2.  Viral URI with cough        Disposition: Discharge     Follow-up Information     Follow up With Specialties Details Why Contact Info    Tom Miner MD Otolaryngology, Surgery In 1 week  [address] [facility] EMERGENCY DEPT Emergency Medicine  As needed, If symptoms worsen; or [address]  [phone]    Your Primary Care Physician  In 3 days      Tyree Dunacn MD Internal Medicine  Primary Care Resource [address] [facility]  [address] [facility] [address] [phone]      [address] [facility]  In 3 days [facility] [address] [facility], [address] [facility] [address]    [facility]  In 3 days [facility] [address]  [phone]           Patient's Medications   Start Taking    AMOXICILLIN-CLAVULANATE (AUGMENTIN) 875-125 MG PER TABLET    Take 1 Tablet by mouth two (2) times a day for 7 days. BENZONATATE (TESSALON PERLES) 100 MG CAPSULE    Take 1 Capsule by mouth three (3) times daily as needed for Cough for up to 5 days. You must keep this away from any small children as it is toxic to small children. HYDROCODONE-ACETAMINOPHEN (NORCO) 5-325 MG PER TABLET    Take 1 Tablet by mouth every six (6) hours as needed for Pain for up to 3 days. Max Daily Amount: 4 Tablets. Continue Taking    CYCLOBENZAPRINE (FLEXERIL) 10 MG TABLET    Take 1 Tablet by mouth three (3) times daily as needed for Muscle Spasm(s). ONDANSETRON (ZOFRAN ODT) 4 MG DISINTEGRATING TABLET    1 Tablet by SubLINGual route every eight (8) hours as needed for Nausea or Vomiting. ONDANSETRON (ZOFRAN ODT) 4 MG DISINTEGRATING TABLET    1 Tablet by SubLINGual route every eight (8) hours as needed for Nausea or Vomiting. PNV 12-IRON-METHYLFOLATE-DHA 29 MG IRON-1 MG -350 MG CKDR    Take  by mouth. These Medications have changed    No medications on file   Stop Taking    No medications on file       Ramiro June DO   Emergency Medicine   November 18, 2021, 3:19 AM     This note is dictated utilizing Dragon voice recognition software. Unfortunately this leads to occasional typographical errors using the voice recognition. I apologize in advance if the situation occurs. If questions occur please do not hesitate to contact me directly.     Ramiro June, DO

## 2021-11-18 NOTE — DISCHARGE INSTRUCTIONS
?No swimming or diving  ? Avoid getting water in the affected ear when bathing or showering (ie, use a cotton ball coated with petroleum jelly in the ear to create a barrier)    It looks like your tympanic membrane (eardrum) is ruptured, it looks to be pretty small. I think you need to follow-up with an ear nose and throat doctor or primary care physician is in a tentative as well. I have given you resources for both. This should heal with antibiotics. Until then you should not get any water in the ear. You may notice a small amount of drainage. That is okay and expected. The pain should improve over the next several days. Take antibiotic until they are gone. Take the cough medicine, Tessalon Perles as needed. You should keep them away from small children. You can take ibuprofen as well or naproxen. I prescribed you Norco which is a stronger pain medicine which you can take for more severe pain particularly at night. Return if you are experiencing new or worsening symptoms.

## 2021-11-18 NOTE — Clinical Note
[facility] [place]  [facility] EMERGENCY DEPT  [address] [facility] [address] [phone]    Work/School Note    Date: 11/18/2021    To Whom It May concern:    Enrique Benton was seen and treated today in the emergency room by the following provider(s):  Attending Provider: Chris Dill is excused from work/school on 11/18/21 and 11/19/21. She is medically clear to return to work/school on 11/20/2021.        Sincerely,          Rudi Mckeon, DO

## 2022-06-23 ENCOUNTER — HOSPITAL ENCOUNTER (EMERGENCY)
Age: 32
Discharge: HOME | End: 2022-06-23
Attending: EMERGENCY MEDICINE

## 2022-06-23 VITALS
DIASTOLIC BLOOD PRESSURE: 71 MMHG | SYSTOLIC BLOOD PRESSURE: 111 MMHG | TEMPERATURE: 97.5 F | HEIGHT: 56 IN | HEART RATE: 96 BPM | RESPIRATION RATE: 18 BRPM | WEIGHT: 114 LBS | OXYGEN SATURATION: 100 % | BODY MASS INDEX: 25.64 KG/M2

## 2022-06-23 DIAGNOSIS — H57.89 IRRITATION OF BOTH EYES: Primary | ICD-10-CM

## 2022-06-23 DIAGNOSIS — Z97.3 WEARS CONTACT LENSES: ICD-10-CM

## 2022-06-23 PROCEDURE — 74011250637 HC RX REV CODE- 250/637: Performed by: PHYSICIAN ASSISTANT

## 2022-06-23 PROCEDURE — 99283 EMERGENCY DEPT VISIT LOW MDM: CPT

## 2022-06-23 RX ORDER — PROPARACAINE HYDROCHLORIDE 5 MG/ML
1 SOLUTION/ DROPS OPHTHALMIC
Status: DISCONTINUED | OUTPATIENT
Start: 2022-06-23 | End: 2022-06-23 | Stop reason: HOSPADM

## 2022-06-23 RX ORDER — MOXIFLOXACIN 5 MG/ML
1 SOLUTION/ DROPS OPHTHALMIC 3 TIMES DAILY
Qty: 3 ML | Refills: 0 | Status: SHIPPED | OUTPATIENT
Start: 2022-06-23 | End: 2022-06-30

## 2022-06-23 RX ORDER — ERYTHROMYCIN 5 MG/G
OINTMENT OPHTHALMIC
Status: COMPLETED | OUTPATIENT
Start: 2022-06-23 | End: 2022-06-23

## 2022-06-23 RX ADMIN — ERYTHROMYCIN: 5 OINTMENT OPHTHALMIC at 18:17

## 2022-06-23 NOTE — ED PROVIDER NOTES
EMERGENCY DEPARTMENT HISTORY AND PHYSICAL EXAM    Date: 6/23/2022  Patient Name: Molly Parks    History of Presenting Illness     Time Seen:6:12 PM    Chief Complaint   Patient presents with    Eye Problem       History Provided By: Patient    Additional History (Context):   Molly Parks is a 36 y.o. female who presents to the emergency room with c/o bilateral eye pain/burning. Patient states the symptoms have been ongoing for the last several weeks. However today while driving to work she complained of severe eye burning bilaterally. Increased tearing. Hard time keeping eyes open. Patient is an extended contact wearer. She believes that her contacts might be very old. However she does not have a new pair to wear. She does not have glasses. She states that she is \"legally blind\" without glasses or contacts. Complains of light sensitivity. Excessive tearing. She has a scheduled appointment to see ophthalmology in July. PCP: None    Current Facility-Administered Medications   Medication Dose Route Frequency Provider Last Rate Last Admin    fluorescein (FLU-ASHLY) 0.6 mg ophthalmic strip 1 Strip  1 Strip Both Eyes NOW Jason Head PA        proparacaine (OPTHAINE) 0.5 % ophthalmic solution 1 Drop  1 Drop Both Eyes NOW Jason Head PA         Current Outpatient Medications   Medication Sig Dispense Refill    moxifloxacin (Vigamox) 0.5 % ophthalmic solution Administer 1 Drop to both eyes three (3) times daily for 7 days. 3 mL 0       Past History     Past Medical History:  No past medical history on file. Past Surgical History:  No past surgical history on file. Family History:  No family history on file. Social History:  Social History     Tobacco Use    Smoking status: Former Smoker    Smokeless tobacco: Never Used   Substance Use Topics    Alcohol use: Never    Drug use: Never       Allergies:   Allergies   Allergen Reactions    Latex Hives    Pineapple Swelling    Nickel Rash Review of Systems   Review of Systems   Eyes: Positive for photophobia, pain, discharge, redness and visual disturbance. Negative for itching. Skin: Negative for rash. Neurological: Negative for headaches. Physical Exam     Vitals:    06/23/22 1743   BP: 111/71   Pulse: 96   Resp: 18   Temp: 97.5 °F (36.4 °C)   SpO2: 100%   Weight: 51.7 kg (114 lb)   Height: 4' 8\" (1.422 m)     Physical Exam  Vitals and nursing note reviewed. Constitutional:       Appearance: Normal appearance. She is well-developed, well-groomed and normal weight. Comments: Very uncomfortable appearing 82-year-old female. Vital signs are stable. Cooperative. Eyes:      General: Lids are normal. Lids are everted, no foreign bodies appreciated. Right eye: Discharge present. No foreign body. Left eye: Discharge present. No foreign body. Extraocular Movements: Extraocular movements intact. Conjunctiva/sclera:      Right eye: Right conjunctiva is injected. Left eye: Left conjunctiva is injected. Pupils: Pupils are equal, round, and reactive to light. Right eye: Fluorescein uptake present. No corneal abrasion. Left eye: Fluorescein uptake present. No corneal abrasion. Slit lamp exam:     Right eye: No corneal ulcer. Left eye: No corneal ulcer. Comments: Excessive tearing noted from both eyes  Conjunctive a mildly injected  Fluorescein uptake around the limbus   Skin:     General: Skin is warm and dry. Neurological:      Mental Status: She is alert and oriented to person, place, and time. Psychiatric:         Behavior: Behavior is cooperative. Nursing note and vitals reviewed      Diagnostic Study Results     Labs -   No results found for this or any previous visit (from the past 24 hour(s)).     Radiologic Studies   No orders to display     CT Results  (Last 48 hours)    None        CXR Results  (Last 48 hours)    None            Medical Decision Making I am the first provider for this patient. I reviewed the vital signs, available nursing notes, past medical history, past surgical history, family history and social history. Vital Signs-Reviewed the patient's vital signs. Records Reviewed: Nursing Notes    DDX:  Bilateral eye irritation  Concern for keratitis secondary to extended contact wear    Procedures:  Procedures    ED Course:   Initial assessment performed. The patients presenting problems have been discussed, and they are in agreement with the care plan formulated and outlined with them. I have encouraged them to ask questions as they arise throughout their visit. ED Physician Discussion Note:   Had patient remove her contacts here in the emergency room  Erythromycin ointment placed in the eyes currently  Patient will be sent home with a work note for the next 3 days  Reminded patient that she should not use these contacts at all. Also recommended no contact wearing altogether for the next week  Antibiotic drop as prescribed    Discharge    Diagnosis and Disposition       DISCHARGE NOTE:  Enedina Dan  results have been reviewed with her. She has been counseled regarding her diagnosis, treatment, and plan. She verbally conveys understanding and agreement of the signs, symptoms, diagnosis, treatment and prognosis and additionally agrees to follow up as discussed. She also agrees with the care-plan and conveys that all of her questions have been answered. I have also provided discharge instructions for her that include: educational information regarding their diagnosis and treatment, and list of reasons why they would want to return to the ED prior to their follow-up appointment, should her condition change. She has been provided with education for proper emergency department utilization. CLINICAL IMPRESSION:    1. Irritation of both eyes    2. Wears contact lenses        PLAN:  1. D/C Home  2.    Discharge Medication List as of 6/23/2022  6:34 PM      START taking these medications    Details   moxifloxacin (Vigamox) 0.5 % ophthalmic solution Administer 1 Drop to both eyes three (3) times daily for 7 days. , Normal, Disp-3 mL, R-0           3. Follow-up Information     Follow up With Specialties Details Why Contact Info    Cephas Hashimoto, MD Ophthalmology Go to   [facility] [facility] [address] [phone]      Erin Botello MD Ophthalmology Go to   [address] [place] [address] 23  [phone]      [facility] EMERGENCY DEPT Emergency Medicine  If symptoms worsen, As needed [address] [address] [phone]        ____________________________________     Please note that this dictation was completed with Whyd, the computer voice recognition software. Quite often unanticipated grammatical, syntax, homophones, and other interpretive errors are inadvertently transcribed by the computer software. Please disregard these errors. Please excuse any errors that have escaped final proofreading.

## 2022-06-23 NOTE — Clinical Note
[facility] [place]  [facility] EMERGENCY DEPT  [address]  [facility] [address]  [id number] [phone]    Work/School Note    Date: 6/23/2022    To Whom It May concern:    Renate Claude was seen and treated today in the emergency room by the following provider(s):  Attending Provider: Daniela Peraza MD  Physician Assistant: AXEL Dolan. Renate Claude is excused from work/school on 6/23/2022 through 6/25/2022. She is medically clear to return to work/school on 6/26/2022.          Sincerely,          AXEL Chilel

## 2022-06-23 NOTE — ED TRIAGE NOTES
Pt arrives to ed reporting bilateral eye burning. Pt states her contacts may be old and has not been able to get into an eye dr. Denies injury to eyes.

## 2022-06-23 NOTE — DISCHARGE INSTRUCTIONS
Absolutely no contacts for 1 week  Tylenol/Motrin for pain  Need to be seen by ophthalmology within the next week  Medication as prescribed  Work note